# Patient Record
Sex: MALE | Race: WHITE | NOT HISPANIC OR LATINO
[De-identification: names, ages, dates, MRNs, and addresses within clinical notes are randomized per-mention and may not be internally consistent; named-entity substitution may affect disease eponyms.]

---

## 2020-01-09 ENCOUNTER — APPOINTMENT (OUTPATIENT)
Dept: PULMONOLOGY | Facility: CLINIC | Age: 70
End: 2020-01-09
Payer: COMMERCIAL

## 2020-01-09 VITALS
DIASTOLIC BLOOD PRESSURE: 60 MMHG | RESPIRATION RATE: 17 BRPM | BODY MASS INDEX: 26.72 KG/M2 | HEART RATE: 76 BPM | SYSTOLIC BLOOD PRESSURE: 120 MMHG | HEIGHT: 66 IN | OXYGEN SATURATION: 96 % | WEIGHT: 166.25 LBS

## 2020-01-09 PROCEDURE — 94060 EVALUATION OF WHEEZING: CPT

## 2020-01-09 PROCEDURE — 99205 OFFICE O/P NEW HI 60 MIN: CPT | Mod: 25

## 2020-01-09 PROCEDURE — 94729 DIFFUSING CAPACITY: CPT

## 2020-01-09 PROCEDURE — 94726 PLETHYSMOGRAPHY LUNG VOLUMES: CPT

## 2020-01-09 PROCEDURE — ZZZZZ: CPT

## 2020-01-09 PROCEDURE — G0296 VISIT TO DETERM LDCT ELIG: CPT

## 2020-01-09 NOTE — PHYSICAL EXAM
[General Appearance - Well Developed] : well developed [Normal Appearance] : normal appearance [Well Groomed] : well groomed [General Appearance - Well Nourished] : well nourished [General Appearance - In No Acute Distress] : no acute distress [No Deformities] : no deformities [Normal Conjunctiva] : the conjunctiva exhibited no abnormalities [Eyelids - No Xanthelasma] : the eyelids demonstrated no xanthelasmas [Normal Oropharynx] : normal oropharynx [Neck Appearance] : the appearance of the neck was normal [Neck Cervical Mass (___cm)] : no neck mass was observed [Jugular Venous Distention Increased] : there was no jugular-venous distention [Thyroid Diffuse Enlargement] : the thyroid was not enlarged [Heart Rate And Rhythm] : heart rate and rhythm were normal [Thyroid Nodule] : there were no palpable thyroid nodules [Heart Sounds] : normal S1 and S2 [Murmurs] : no murmurs present [Exaggerated Use Of Accessory Muscles For Inspiration] : no accessory muscle use [Respiration, Rhythm And Depth] : normal respiratory rhythm and effort [Abdomen Tenderness] : non-tender [Auscultation Breath Sounds / Voice Sounds] : lungs were clear to auscultation bilaterally [Abdomen Soft] : soft [Nail Clubbing] : no clubbing of the fingernails [Abdomen Mass (___ Cm)] : no abdominal mass palpated [Petechial Hemorrhages (___cm)] : no petechial hemorrhages [Cyanosis, Localized] : no localized cyanosis [Skin Color & Pigmentation] : normal skin color and pigmentation [Skin Turgor] : normal skin turgor [Deep Tendon Reflexes (DTR)] : deep tendon reflexes were 2+ and symmetric [] : no rash [Sensation] : the sensory exam was normal to light touch and pinprick [Oriented To Time, Place, And Person] : oriented to person, place, and time [No Focal Deficits] : no focal deficits [Impaired Insight] : insight and judgment were intact [Affect] : the affect was normal [Abnormal Walk] : normal gait

## 2020-01-09 NOTE — HISTORY OF PRESENT ILLNESS
[FreeTextEntry1] : Patient is a 69 year old male former smoker, greater than 40 pack years, quit 11 years ago, presents to [facility] for evaluation dyspnea. Patient was encouraged  to be evaluated by his wife as she tells him she can hear him breathing heavily.  Patient reports he travels to [place] for work.  He does have difficulty with subway stairs.  He denies cough, chest pain hemoptysis or other systemic complaints.

## 2020-01-09 NOTE — PROCEDURE
[FreeTextEntry1] : PFT 1/9/2020 personally reviewed mild obstructive ventilatory defect without gas exchange abnormality and insignificant bronchodilator response

## 2020-01-09 NOTE — PHYSICAL EXAM
[General Appearance - Well Developed] : well developed [Normal Appearance] : normal appearance [General Appearance - Well Nourished] : well nourished [Well Groomed] : well groomed [No Deformities] : no deformities [General Appearance - In No Acute Distress] : no acute distress [Eyelids - No Xanthelasma] : the eyelids demonstrated no xanthelasmas [Normal Conjunctiva] : the conjunctiva exhibited no abnormalities [Neck Appearance] : the appearance of the neck was normal [Normal Oropharynx] : normal oropharynx [Jugular Venous Distention Increased] : there was no jugular-venous distention [Neck Cervical Mass (___cm)] : no neck mass was observed [Thyroid Diffuse Enlargement] : the thyroid was not enlarged [Heart Rate And Rhythm] : heart rate and rhythm were normal [Thyroid Nodule] : there were no palpable thyroid nodules [Heart Sounds] : normal S1 and S2 [Murmurs] : no murmurs present [Exaggerated Use Of Accessory Muscles For Inspiration] : no accessory muscle use [Respiration, Rhythm And Depth] : normal respiratory rhythm and effort [Abdomen Tenderness] : non-tender [Abdomen Soft] : soft [Auscultation Breath Sounds / Voice Sounds] : lungs were clear to auscultation bilaterally [Abdomen Mass (___ Cm)] : no abdominal mass palpated [Nail Clubbing] : no clubbing of the fingernails [Petechial Hemorrhages (___cm)] : no petechial hemorrhages [Cyanosis, Localized] : no localized cyanosis [Skin Color & Pigmentation] : normal skin color and pigmentation [] : no rash [Skin Turgor] : normal skin turgor [Deep Tendon Reflexes (DTR)] : deep tendon reflexes were 2+ and symmetric [Oriented To Time, Place, And Person] : oriented to person, place, and time [No Focal Deficits] : no focal deficits [Sensation] : the sensory exam was normal to light touch and pinprick [Impaired Insight] : insight and judgment were intact [Affect] : the affect was normal [Abnormal Walk] : normal gait

## 2020-01-09 NOTE — HISTORY OF PRESENT ILLNESS
[FreeTextEntry1] : Patient is a 69 year old male former smoker, greater than 40 pack years, quit 11 years ago, presents to [facility] for evaluation dyspnea. Patient was encouraged  to be evaluated by his wife as she tells him she can hear him breathing heavily.  Patient reports he travels to [place] for work.  He does have difficulty with subway stairs.  He denies cough, chest pain hemoptysis or other systemic complaints.

## 2020-03-22 ENCOUNTER — TRANSCRIPTION ENCOUNTER (OUTPATIENT)
Age: 70
End: 2020-03-22

## 2020-06-09 ENCOUNTER — APPOINTMENT (OUTPATIENT)
Dept: PULMONOLOGY | Facility: CLINIC | Age: 70
End: 2020-06-09
Payer: COMMERCIAL

## 2020-06-09 ENCOUNTER — LABORATORY RESULT (OUTPATIENT)
Age: 70
End: 2020-06-09

## 2020-06-09 VITALS
HEIGHT: 65 IN | HEART RATE: 70 BPM | SYSTOLIC BLOOD PRESSURE: 120 MMHG | BODY MASS INDEX: 27.49 KG/M2 | DIASTOLIC BLOOD PRESSURE: 60 MMHG | TEMPERATURE: 97.8 F | OXYGEN SATURATION: 97 % | RESPIRATION RATE: 16 BRPM | WEIGHT: 165 LBS

## 2020-06-09 DIAGNOSIS — Z87.19 PERSONAL HISTORY OF OTHER DISEASES OF THE DIGESTIVE SYSTEM: ICD-10-CM

## 2020-06-09 DIAGNOSIS — Z01.812 ENCOUNTER FOR PREPROCEDURAL LABORATORY EXAMINATION: ICD-10-CM

## 2020-06-09 DIAGNOSIS — Z86.19 PERSONAL HISTORY OF OTHER INFECTIOUS AND PARASITIC DISEASES: ICD-10-CM

## 2020-06-09 DIAGNOSIS — Z81.2 FAMILY HISTORY OF TOBACCO ABUSE AND DEPENDENCE: ICD-10-CM

## 2020-06-09 DIAGNOSIS — Z82.62 FAMILY HISTORY OF OSTEOPOROSIS: ICD-10-CM

## 2020-06-09 DIAGNOSIS — Z87.09 PERSONAL HISTORY OF OTHER DISEASES OF THE RESPIRATORY SYSTEM: ICD-10-CM

## 2020-06-09 PROCEDURE — 94618 PULMONARY STRESS TESTING: CPT

## 2020-06-09 PROCEDURE — 71046 X-RAY EXAM CHEST 2 VIEWS: CPT

## 2020-06-09 PROCEDURE — 99204 OFFICE O/P NEW MOD 45 MIN: CPT | Mod: 25

## 2020-06-09 NOTE — PHYSICAL EXAM
[No Acute Distress] : no acute distress [Normal Oropharynx] : normal oropharynx [Normal Appearance] : normal appearance [No Neck Mass] : no neck mass [Normal Rate/Rhythm] : normal rate/rhythm [Normal S1, S2] : normal s1, s2 [No Murmurs] : no murmurs [No Resp Distress] : no resp distress [Clear to Auscultation Bilaterally] : clear to auscultation bilaterally [No Abnormalities] : no abnormalities [Benign] : benign [Normal Gait] : normal gait [No Clubbing] : no clubbing [No Cyanosis] : no cyanosis [No Edema] : no edema [FROM] : FROM [Normal Color/ Pigmentation] : normal color/ pigmentation [No Focal Deficits] : no focal deficits [Oriented x3] : oriented x3 [Normal Affect] : normal affect [II] : Mallampati Class: II [TextBox_68] : I:E ratio 1:3; mild expiratory wheeze

## 2020-06-09 NOTE — ADDENDUM
[FreeTextEntry1] : Documented by Merry Ortiz acting as a scribe for Dr. Grzegorz Corado on 06/09/2020.\par \par All medical record entries made by the Scribe were at my, Dr. Grzegorz Corado's, direction and personally dictated by me on 06/09/2020. I have reviewed the chart and agree that the record accurately reflects my personal performance of the history, physical exam, assessment and plan. I have also personally directed, reviewed, and agree with the discharge instructions.

## 2020-06-09 NOTE — HISTORY OF PRESENT ILLNESS
Conjunctivitis, Bacterial    You have an infection in the membranes covering the white part of the eye. This part of the eye is called the conjunctiva. The infection is called conjunctivitis. The most common symptoms of conjunctivitis include a thick, pus-like discharge from the eye, swollen eyelids, redness, eyelids sticking together upon awakening, and a gritty or scratchy feeling in the eye. Your infection was caused by bacteria. It may be treated with medicine. With treatment, the infection takes about 7 to 10 days to resolve.  Home care  · Use prescribed antibiotic eye drops or ointment as directed to treat the infection.  · Apply a warm compress (towel soaked in warm water) to the affected eye 3 to 4 times a day. Do this just before applying medicine to the eye.  · Use a warm, wet cloth to wipe away crusting of the eyelids in the morning. This is caused by mucus drainage during the night. You may also use saline irrigating solution or artificial tears to rinse away mucus in the eye. Do not put a patch over the eye.  · Wash your hands before and after touching the infected eye. This is to prevent spreading the infection to the other eye, and to other people. Do not share your towels or washcloths with others.  · You may use acetaminophen or ibuprofen to control pain, unless another medicine was prescribed. (Note: If you have chronic liver or kidney disease or have ever had a stomach ulcer or gastrointestinal bleeding, talk with your doctor before using these medicines.)  · Do not wear contact lenses until your eyes have healed and all symptoms are gone.  Follow-up care  Follow up with your healthcare provider, or as advised.  When to seek medical advice  Call your healthcare provider right away if any of these occur:  · Worsening vision  · Increasing pain in the eye  · Increasing swelling or redness of the eyelid  · Redness spreading around the eye  Date Last Reviewed: 6/14/2015  © 8649-3727 [employer]. [address] [place] [address], [place] [address]. All rights reserved. This information is not intended as a substitute for professional medical care. Always follow your healthcare professional's instructions.         [TextBox_4] : Mr. TAYLOR is a 70 year old male presenting to the office today for initial pulmonary evaluation. His chief complaint is\par - He has been having some difficulty breathing, he has trouble catching his breath.\par - he was put on Spiriva by another doctor. He did not feel like Spiriva was helping much.  \par - He saw , she sent him for a CT, she gave him samples for Anoro. He waited for his CT scan results for awhile now. \par - his SOB happens sometimes when he walks, when he's watching TV, it will come on randomly \par - sometimes he has a slight chest pressure / pain \par - he has never seen a cardiologist \par - his sinuses are fine \par - he has recently gained weight due to the quarantine because of the pandemic\par - when he wakes up in the morning he does not feel rested, he never has. \par - He usually wakes up several times a night, he wakes up and goes back to sleep. He does not sleep through the night at all. \par - he notes he snores a lot, his  prescribed him Flonase and he uses the nasal strips. When he uses those two he does not snore as MUCH but still snores\par - he notes he probably moves his legs are night because whenever he lays down he's always moving his legs\par - his memory / concentration could be better \par - his neck size 15 1/2 \par - could fall asleep while watching a boring TV show  \par - could fall asleep in a car, when he drives he feels like he could fall asleep as well \par - he notes he has always had visual issues, he feels his eyes have gotten worse during the years. His eyes get tired, in the morning his eyes are blurry as well. \par - he notes he has had muscle aches / pains lately\par - he feels like there' s a "flap" in his throat which causes him to clear his throat and cough to clear. \par -he denies any headaches, nausea, vomiting, fever, chills, sweats, chest pain, chest pressure, diarrhea, constipation, dysphagia, dizziness, leg swelling, leg pain, itchy eyes, itchy ears, heartburn, reflux, sour taste in the mouth, myalgias or arthralgias.

## 2020-06-09 NOTE — ASSESSMENT
[FreeTextEntry1] : Mr. TAYLOR is a 70 year old male with a history of  childhood illnesses, former 40+ pack year smoker none in 11 years, some chronic back issues, lumbar radiculopathy, LPR/GERD, mild COPD who comes into the office today for pulmonary evaluation for SOB\par \par The patient's shortness of breath is multifactorial due to:\par -pulmonary disease \par      - COPD \par -poor breathing mechanics \par -out of shape\par -?cardiac disease \par \par \par Problem 1:COPD (some emphysema)\par - Add Trelegy 1 inhalation QD (sample given) OR Stiolto at 2 inhalations daily  (sample given)\par - Get Blood test: Alpha1 Antitrypsin level \par -COPD is a progressive disease and although it can’t be cured , appropriate management can slow its progression, reduce frequency and severity of exacerbations, and improve symptoms and the patient quality of life. Hospitalizations are the greatest contributor to the total COPD costs and account for up to 87% of total COPD related costs. Exacerbations are the main cause of admissions and subsequently account for the 40-75% of COPD costs. Inhaled maintenance therapy reduces the incidence of exacerbations in patients with stable COPD. Incorrect inhaler use and nonadherence are major obstacles to achieving COPD treatment goals. Many COPD patients have challenges (impaired inhalation, limited dexterity, reduced cognition: that limit their ability to correctly use their COPD treatment devices resulting in reduced symptom control. Of most importance is smoking cessation and early intervention with respiratory illnesses and contemplation for pulmonary rehab to enhance quality of life. \par -Inhaler technique reviewed as well as oral hygiene techniques reviewed with patient. Avoidance of cold air, extremes of temperature, rescue inhaler should be used before exercise. Order of medication reviewed with patient. Recommended use of a cool mist humidifier in the bedroom.\par \par  \par \par Problem 2: LPR/ GERD \par -continue Protonix 40 mg before breakfast \par -Rule of 2s: avoid eating too much, eating too fast, eating too late, eating too spicy, eating too lousy, eating two hours before bed.\par -Things to avoid including overeating, spicy foods, tight clothing, eating within three hours of bed, this list is not all inclusive. \par -For treatment of reflux, possible options discussed including diet control, H2 blockers, PPIs, as well as coating motility agents discussed as treatment options. Timing of meals and proximity of last meal to sleep were discussed. If symptoms persist, a formal gastrointestinal evaluation is needed.\par \par \par \par Problem 3: Lung CA screening\par - Next CT in march\par Lung cancer screening is recommended for people between the ages of 55 and 80 with prior 30+ pack year smoking histories. There is irrefutable evidence for realization of lung cancer screening based on two large randomized control trials demonstrating relative reduction in lung cancer mortality for patients undergoing low-dose CT scanning. Risks and benefits reviewed with the patient.\par \par  \par \par Problem 4: Poor sleep / rule out ?SYDNEY   (memory/concentration, metabolism, reflux)\par - Recommended Home Sleep Study \par - Recommended oral appliance \par Sleep apnea is associated with adverse clinical consequences which an affect most organ systems. Cardiovascular disease risk includes arrhythmias, atrial fibrillation, hypertension, coronary artery disease, and stroke. Metabolic disorders include diabetes type 2, non-alcoholic fatty liver disease. Mood disorder especially depression; and cognitive decline especially in the elderly. Associations with chronic reflux/Winkler’s esophagus some but not all inclusive. \par \par \par  Problem 4: ?RLS\par -Studies to complete: iron studies, thyroid function test, free and total testosterone level \par - Add Mirapex .5 mg QHS \par Restless Legs Syndrome (RLS), also known as Wing-Ekbom Disease, is a common sleep -related movement disorder. About 1 in 10 adults in the [place] have problems from restless leg syndrome. It also can be seen in about 2% of children. Women are twice as likely as men to have RLS. People with RLS will have symptoms most often during times when they are less active, especially at bedtime. RLS most often causes an overwhelming urge to move your legs and sometimes other parts of your body. This urge is associated with unpleasant sensations in different parts of the body. The symptoms can be mild to severe and can affect your ability to go to sleep and stay asleep. People with RLS often sleep less at night and feel more tired during the day. \par \par  \par Problem 5: Poor Mechanics of Breathing / Anxiety \par - Proper breathing techniques were reviewed with an emphasis of exhalation. Patient instructed to breath in for 1 second and out for four seconds. Patient was encouraged to not talk while walking. \par \par Problem 6 : Out of shape\par -Weight loss, exercise, and diet control were discussed and are highly encouraged. Treatment options were given such as, aqua therapy, and contacting a nutritionist. Recommended to use the elliptical, stationary bike, less use of treadmill.  \par \par Problem 7 : Cardiac\par -recommended to follow up with a cardiologist\par \par Problem 8 : Health Maintenance/COVID19 Precautions:\par - Clean your hands often. Wash your hands often with soap and water for at least 20 seconds, especially after blowing your nose, coughing, or sneezing, or having been in a public place.\par - If soap and water are not available, use a hand  that contains at least 60% alcohol.\par - To the extent possible, avoid touching high-touch surfaces in public places - elevator buttons, door handles, handrails, handshaking with people, etc. Use a tissue or your sleeve to cover your hand or finger if you must touch something.\par - Wash your hands after touching surfaces in public places.\par - Avoid touching your face, nose, eyes, etc.\par - Clean and disinfect your home to remove germs: practice routine cleaning of frequently touched surfaces (for example: tables, doorknobs, light switches, handles, desks, toilets, faucets, sinks & cell phones)\par - Avoid crowds, especially in poorly ventilated spaces. Your risk of exposure to respiratory viruses like COVID-19 may increase in crowded, closed-in settings with little air circulation if there are people in the crowd who are sick. All patients are recommended to practice social distancing and stay at least 6 feet away from others.\par - Avoid all non-essential travel including plane trips, and especially avoid embarking on cruise ships.\par -If COVID-19 is spreading in your community, take extra measures to put distance between yourself and other people to further reduce your risk of being exposed to this new virus.\par -Stay home as much as possible.\par - Consider ways of getting food brought to your house through family, social, or commercial networks\par -Be aware that the virus has been known to live in the air up to 3 hours post exposure, cardboard up to 24 hours post exposure, copper up to 4 hours post exposure, steel and plastic up to 2-3 days post exposure. Risk of transmission from these surfaces are affected by many variables.\par Immune Support Recommendations:\par -OTC Vitamin C 500mg BID \par -OTC Quercetin 250-500mg BID \par -OTC Zinc 75-100mg per day \par -OTC Melatonin 1 or 2 mg a night \par -OTC Vitamin D 1-4000mg per day \par -OTC Tonic Water 8oz per day\par Asthma and COVID19:\par You need to make sure your asthma is under control. This often requires the use of inhaled corticosteroids (and sometimes oral corticosteroids). Inhaled corticosteroids do not likely reduce your immune system’s ability to fight infections, but oral corticosteroids may. It is important to use the steps above to protect yourself to limit your exposure to any respiratory virus.\par \par \par Problem 9 : health maintenance\par -s/p influenza vaccine\par -recommended strep pneumonia vaccines after age 65: Prevnar-13 vaccine, followed by Pneumo vaccine 23 one year following\par -recommended early intervention for URIs\par -recommended regular osteoporosis evaluations\par -recommended early dermatological evaluations\par -recommended after the age of 50 to the age of 70, colonoscopy every 5 years \par \par  Follow up in 6-8 weeks\par -he  is recommended to call with any changes, questions, or concerns.

## 2020-06-10 LAB
A1AT SERPL-MCNC: 129 MG/DL
FERRITIN SERPL-MCNC: 159 NG/ML
IRON SATN MFR SERPL: 33 %
IRON SERPL-MCNC: 123 UG/DL
T3FREE SERPL-MCNC: 3.22 PG/ML
T4 FREE SERPL-MCNC: 1.2 NG/DL
TIBC SERPL-MCNC: 370 UG/DL
TSH SERPL-ACNC: 2.66 UIU/ML
UIBC SERPL-MCNC: 248 UG/DL

## 2020-06-14 LAB
TESTOST BND SERPL-MCNC: 9.2 PG/ML
TESTOST SERPL-MCNC: 483.2 NG/DL

## 2020-06-15 ENCOUNTER — TRANSCRIPTION ENCOUNTER (OUTPATIENT)
Age: 70
End: 2020-06-15

## 2020-06-16 LAB
A1AT PHENOTYP SERPL-IMP: NORMAL BANDS
A1AT SERPL-MCNC: 127 MG/DL

## 2020-06-24 ENCOUNTER — TRANSCRIPTION ENCOUNTER (OUTPATIENT)
Age: 70
End: 2020-06-24

## 2020-08-24 PROBLEM — Z01.812 PRE-PROCEDURAL LABORATORY EXAMINATION: Status: ACTIVE | Noted: 2020-08-24

## 2020-09-01 LAB — SARS-COV-2 N GENE NPH QL NAA+PROBE: NOT DETECTED

## 2020-09-02 ENCOUNTER — APPOINTMENT (OUTPATIENT)
Dept: PULMONOLOGY | Facility: CLINIC | Age: 70
End: 2020-09-02
Payer: COMMERCIAL

## 2020-09-02 VITALS
DIASTOLIC BLOOD PRESSURE: 70 MMHG | WEIGHT: 161 LBS | OXYGEN SATURATION: 98 % | RESPIRATION RATE: 16 BRPM | TEMPERATURE: 97.7 F | BODY MASS INDEX: 26.82 KG/M2 | SYSTOLIC BLOOD PRESSURE: 110 MMHG | HEART RATE: 73 BPM | HEIGHT: 65 IN

## 2020-09-02 PROCEDURE — 99214 OFFICE O/P EST MOD 30 MIN: CPT | Mod: 25

## 2020-09-02 PROCEDURE — 94010 BREATHING CAPACITY TEST: CPT

## 2020-09-02 PROCEDURE — 95012 NITRIC OXIDE EXP GAS DETER: CPT

## 2020-09-02 PROCEDURE — 94727 GAS DIL/WSHOT DETER LNG VOL: CPT

## 2020-09-02 PROCEDURE — 94729 DIFFUSING CAPACITY: CPT

## 2020-09-02 NOTE — ASSESSMENT
[FreeTextEntry1] : Mr. TAYLOR is a 70 year old male with a history of  childhood illnesses, former 40+ pack year smoker none in 11 years, some chronic back issues, lumbar radiculopathy, LPR/GERD, mild COPD who comes into the office today for follow up pulmonary evaluation for SOB - improved \par \par The patient's shortness of breath is multifactorial due to:\par -pulmonary disease \par      - COPD \par -poor breathing mechanics \par -out of shape\par -?cardiac disease \par \par \par Problem 1:COPD (some emphysema) - improved \par - Add Trelegy 1 inhalation QD (sample given) rather than Spiriva \par - s/p Blood test: Alpha1 Antitrypsin level (WNL)\par -COPD is a progressive disease and although it can’t be cured , appropriate management can slow its progression, reduce frequency and severity of exacerbations, and improve symptoms and the patient quality of life. Hospitalizations are the greatest contributor to the total COPD costs and account for up to 87% of total COPD related costs. Exacerbations are the main cause of admissions and subsequently account for the 40-75% of COPD costs. Inhaled maintenance therapy reduces the incidence of exacerbations in patients with stable COPD. Incorrect inhaler use and nonadherence are major obstacles to achieving COPD treatment goals. Many COPD patients have challenges (impaired inhalation, limited dexterity, reduced cognition: that limit their ability to correctly use their COPD treatment devices resulting in reduced symptom control. Of most importance is smoking cessation and early intervention with respiratory illnesses and contemplation for pulmonary rehab to enhance quality of life. \par -Inhaler technique reviewed as well as oral hygiene techniques reviewed with patient. Avoidance of cold air, extremes of temperature, rescue inhaler should be used before exercise. Order of medication reviewed with patient. Recommended use of a cool mist humidifier in the bedroom.\par \par  \par \par Problem 2: LPR/ GERD \par -continue Protonix 40 mg before breakfast \par -Rule of 2s: avoid eating too much, eating too fast, eating too late, eating too spicy, eating too lousy, eating two hours before bed.\par -Things to avoid including overeating, spicy foods, tight clothing, eating within three hours of bed, this list is not all inclusive. \par -For treatment of reflux, possible options discussed including diet control, H2 blockers, PPIs, as well as coating motility agents discussed as treatment options. Timing of meals and proximity of last meal to sleep were discussed. If symptoms persist, a formal gastrointestinal evaluation is needed.\par \par \par Problem 3: Lung CA screening\par - Next CT in march 2021\par Lung cancer screening is recommended for people between the ages of 55 and 80 with prior 30+ pack year smoking histories. There is irrefutable evidence for realization of lung cancer screening based on two large randomized control trials demonstrating relative reduction in lung cancer mortality for patients undergoing low-dose CT scanning. Risks and benefits reviewed with the patient.\par \par \par Problem 4: Poor sleep / (+) mild SYDNEY   (memory/concentration, metabolism, reflux)\par - s/p Home Sleep Study - c/w mild sleep apnea \par - unable to use oral appliance ; - Sleep Right / Oxy Aid / Chin Strap \par Sleep apnea is associated with adverse clinical consequences which an affect most organ systems. Cardiovascular disease risk includes arrhythmias, atrial fibrillation, hypertension, coronary artery disease, and stroke. Metabolic disorders include diabetes type 2, non-alcoholic fatty liver disease. Mood disorder especially depression; and cognitive decline especially in the elderly. Associations with chronic reflux/Winkler’s esophagus some but not all inclusive. \par \par \par  Problem 4: ?RLS\par -s/p : iron studies, thyroid function test, free and total testosterone level (WNL)\par - continue Mirapex .5 mg QHS \par Restless Legs Syndrome (RLS), also known as Wing-Ekbom Disease, is a common sleep -related movement disorder. About 1 in 10 adults in the [place] have problems from restless leg syndrome. It also can be seen in about 2% of children. Women are twice as likely as men to have RLS. People with RLS will have symptoms most often during times when they are less active, especially at bedtime. RLS most often causes an overwhelming urge to move your legs and sometimes other parts of your body. This urge is associated with unpleasant sensations in different parts of the body. The symptoms can be mild to severe and can affect your ability to go to sleep and stay asleep. People with RLS often sleep less at night and feel more tired during the day. \par \par  \par Problem 5: Poor Mechanics of Breathing / Anxiety \par - Proper breathing techniques were reviewed with an emphasis of exhalation. Patient instructed to breath in for 1 second and out for four seconds. Patient was encouraged to not talk while walking. \par \par Problem 6 : Out of shape\par -Weight loss, exercise, and diet control were discussed and are highly encouraged. Treatment options were given such as, aqua therapy, and contacting a nutritionist. Recommended to use the elliptical, stationary bike, less use of treadmill.  \par \par Problem 7 : Cardiac\par -recommended to follow up with a cardiologist\par \par Problem 8 : Health Maintenance/COVID19 Precautions:\par - Clean your hands often. Wash your hands often with soap and water for at least 20 seconds, especially after blowing your nose, coughing, or sneezing, or having been in a public place.\par - If soap and water are not available, use a hand  that contains at least 60% alcohol.\par - To the extent possible, avoid touching high-touch surfaces in public places - elevator buttons, door handles, handrails, handshaking with people, etc. Use a tissue or your sleeve to cover your hand or finger if you must touch something.\par - Wash your hands after touching surfaces in public places.\par - Avoid touching your face, nose, eyes, etc.\par - Clean and disinfect your home to remove germs: practice routine cleaning of frequently touched surfaces (for example: tables, doorknobs, light switches, handles, desks, toilets, faucets, sinks & cell phones)\par - Avoid crowds, especially in poorly ventilated spaces. Your risk of exposure to respiratory viruses like COVID-19 may increase in crowded, closed-in settings with little air circulation if there are people in the crowd who are sick. All patients are recommended to practice social distancing and stay at least 6 feet away from others.\par - Avoid all non-essential travel including plane trips, and especially avoid embarking on cruise ships.\par -If COVID-19 is spreading in your community, take extra measures to put distance between yourself and other people to further reduce your risk of being exposed to this new virus.\par -Stay home as much as possible.\par - Consider ways of getting food brought to your house through family, social, or commercial networks\par -Be aware that the virus has been known to live in the air up to 3 hours post exposure, cardboard up to 24 hours post exposure, copper up to 4 hours post exposure, steel and plastic up to 2-3 days post exposure. Risk of transmission from these surfaces are affected by many variables.\par Immune Support Recommendations:\par -OTC Vitamin C 500mg BID \par -OTC Quercetin 250-500mg BID \par -OTC Zinc 75-100mg per day \par -OTC Melatonin 1 or 2 mg a night \par -OTC Vitamin D 1-4000mg per day \par -OTC Tonic Water 8oz per day\par Asthma and COVID19:\par You need to make sure your asthma is under control. This often requires the use of inhaled corticosteroids (and sometimes oral corticosteroids). Inhaled corticosteroids do not likely reduce your immune system’s ability to fight infections, but oral corticosteroids may. It is important to use the steps above to protect yourself to limit your exposure to any respiratory virus.\par \par \par Problem 9 : health maintenance\par -s/p influenza vaccine\par -recommended strep pneumonia vaccines after age 65: Prevnar-13 vaccine, followed by Pneumo vaccine 23 one year following\par -recommended early intervention for URIs\par -recommended regular osteoporosis evaluations\par -recommended early dermatological evaluations\par -recommended after the age of 50 to the age of 70, colonoscopy every 5 years \par \par  Follow up in 6-8 weeks\par -he  is recommended to call with any changes, questions, or concerns.

## 2020-09-02 NOTE — HISTORY OF PRESENT ILLNESS
[TextBox_4] : Mr. TAYLOR is a 70 year old male presenting to the office today for a follow up pulmonary evaluation. His chief complaint is\par - he has been feeling okay, the last time he was here and he was given the samples of Trelegy, he notes the Trelegy was okay. \par - he had the Spiriva and he renewed it for a month. He does the Spiriva BID in the morning. \par - he notes he felt a bit better with the Trelegy. \par - he notes he [place] into the city and wont get on the train \par - he notes he went to get a COVID swab \par - bowel movements are regular \par - he has a sour taste in the mouth due to the mask \par - he is still coughing sometimes when he eats, when he eats dinner he starts coughing but nothing else significant\par - he notes he has chronic back pain\par - he notes he had the sleep study done \par - he notes he sleeps at 9:30 pm and wakes up at 4am. \par - He denies any visual issues, headaches, nausea, vomiting, fever, chills, sweats, chest pains, chest pressure, diarrhea, constipation, dysphagia, myalgia, dizziness, leg swelling, leg pain, itchy eyes, itchy ears, heartburn, reflux, or sour taste in the mouth.\par

## 2020-09-02 NOTE — PHYSICAL EXAM
[Normal Oropharynx] : normal oropharynx [No Acute Distress] : no acute distress [Normal Rate/Rhythm] : normal rate/rhythm [Normal Appearance] : normal appearance [No Neck Mass] : no neck mass [Normal S1, S2] : normal s1, s2 [No Murmurs] : no murmurs [No Resp Distress] : no resp distress [Clear to Auscultation Bilaterally] : clear to auscultation bilaterally [Benign] : benign [No Abnormalities] : no abnormalities [No Clubbing] : no clubbing [No Cyanosis] : no cyanosis [Normal Gait] : normal gait [No Edema] : no edema [FROM] : FROM [Normal Color/ Pigmentation] : normal color/ pigmentation [No Focal Deficits] : no focal deficits [Oriented x3] : oriented x3 [Normal Affect] : normal affect [III] : Mallampati Class: III [TextBox_68] : I:E 1:3, clear

## 2020-09-02 NOTE — PROCEDURE
[FreeTextEntry1] : Sleep study (Jun.24.2020) revealed sleep apnea with an AHI/ROMELIA of 5.1, snore index of % and a low oxygen saturation of 84% \par \par FULL PFTs reveals mild obstructive moderate flows; FEV1 was   2.33L which is  87% of predicted; normal lung volumes; normal diffusion at 15.6, which is   82% of predicted; normal flow volume loop\par \par  FENO was 22 ; normal value being less than 25\par Fractional exhaled nitric oxide (FENO) is regarded as a simple, noninvasive method for assessing eosinophilic airway inflammation. Produced by a variety of cells within the lung, nitric oxide (NO) concentrations are generally low in healthy individuals. However, high concentrations of NO appear to be involved in nonspecific host defense mechanisms and chronic inflammatory diseases such as asthma. The American Thor[employer] Society (ATS) therefore has recommended using FENO to aid in the diagnosis and monitoring of eosinophilic airway inflammation and asthma, and for identifying steroid responsive individuals whose chronic respiratory symptoms may be airway inflammation.\par \par \par

## 2020-09-02 NOTE — ADDENDUM
[FreeTextEntry1] : Documented by Merry Ortiz acting as a scribe for Dr. Grzegorz Corado on 09/02/2020 \par \par All medical record entries made by the Scribe were at my, Dr. Grzegorz Corado's, direction and personally dictated by me on 09/02/2020 . I have reviewed the chart and agree that the record accurately reflects my personal performance of the history, physical exam, assessment and plan. I have also personally directed, reviewed, and agree with the discharge instructions.

## 2020-11-05 ENCOUNTER — RX RENEWAL (OUTPATIENT)
Age: 70
End: 2020-11-05

## 2020-12-18 ENCOUNTER — RX RENEWAL (OUTPATIENT)
Age: 70
End: 2020-12-18

## 2021-01-05 ENCOUNTER — APPOINTMENT (OUTPATIENT)
Dept: PULMONOLOGY | Facility: CLINIC | Age: 71
End: 2021-01-05
Payer: COMMERCIAL

## 2021-01-05 VITALS
HEIGHT: 65 IN | WEIGHT: 162 LBS | DIASTOLIC BLOOD PRESSURE: 78 MMHG | HEART RATE: 74 BPM | RESPIRATION RATE: 17 BRPM | SYSTOLIC BLOOD PRESSURE: 120 MMHG | OXYGEN SATURATION: 97 % | TEMPERATURE: 98.1 F | BODY MASS INDEX: 26.99 KG/M2

## 2021-01-05 PROCEDURE — 99214 OFFICE O/P EST MOD 30 MIN: CPT

## 2021-01-05 PROCEDURE — 99072 ADDL SUPL MATRL&STAF TM PHE: CPT

## 2021-01-05 RX ORDER — OLOPATADINE HYDROCHLORIDE 665 UG/1
0.6 SPRAY, METERED NASAL
Qty: 3 | Refills: 1 | Status: ACTIVE | COMMUNITY
Start: 2021-01-05 | End: 1900-01-01

## 2021-01-05 NOTE — HISTORY OF PRESENT ILLNESS
[TextBox_4] : Mr. TAYLOR is a 70 year old male presenting to the office today for a follow up pulmonary evaluation. His chief complaint is\par -He notes feeling well in general \par -He notes his wife notices him breathing loudly\par -He notes breathing heavy when lying down\par -He notes wearing the breath right strip and still breathes heavily \par -He notes needing surgery due to having 5 polyps s/p colonoscopy\par -Surgery is 1/26/2021\par -He notes SOB on exertion when walking fast \par -He notes having issues with his throat, noting hoarseness after talking frequently \par -He notes his vocal chords over lap which causes the hoarseness \par -He notes taking Trelegy every day \par -He notes his medication are not making him worse \par -He denies exercising \par \par - denies any headaches, nausea, vomiting, fever, chills, sweats, chest pain, chest pressure, diarrhea, constipation, dysphagia, dizziness, leg swelling, leg pain, itchy eyes, itchy ears, heartburn, reflux, or sour taste in the mouth.

## 2021-01-05 NOTE — ADDENDUM
[FreeTextEntry1] : Documented by Jose Armando Kaminski acting as a scribe for Dr. Grzegorz Corado on (01/05/2021).\par \par All medical record entries made by the Scribe were at my, Dr. Grzegorz Corado's, direction and personally dictated by me on (01/05/2021). I have reviewed the chart and agree that the record accurately reflects my personal performance of the history, physical exam, assessment and plan. I have also personally directed, reviewed, and agree with the discharge instructions.

## 2021-01-05 NOTE — ASSESSMENT
[FreeTextEntry1] : Mr. TAYLOR is a 70 year old male with a history of  childhood illnesses, former 40+ pack year smoker none in 11 years, some chronic back issues, lumbar radiculopathy, LPR/GERD, mild COPD who comes into the office today for follow up pulmonary evaluation for SOB (   of breathing) \par \par ********************************************** Pre-op Clearance*****************************************************************\par -For colonoscopy and possible colon surgery\par \par The patient's shortness of breath is multifactorial due to:\par -pulmonary disease \par      - COPD \par -poor breathing mechanics \par -out of shape\par -?cardiac disease \par \par \par Problem 1:COPD (some emphysema) - improved \par - add Singulair 10 mg QHS\par - continue Trelegy 1 inhalation QD (sample given) rather than Spiriva \par - s/p Blood test: Alpha1 Antitrypsin level (WNL)\par -COPD is a progressive disease and although it can’t be cured , appropriate management can slow its progression, reduce frequency and severity of exacerbations, and improve symptoms and the patient quality of life. Hospitalizations are the greatest contributor to the total COPD costs and account for up to 87% of total COPD related costs. Exacerbations are the main cause of admissions and subsequently account for the 40-75% of COPD costs. Inhaled maintenance therapy reduces the incidence of exacerbations in patients with stable COPD. Incorrect inhaler use and nonadherence are major obstacles to achieving COPD treatment goals. Many COPD patients have challenges (impaired inhalation, limited dexterity, reduced cognition: that limit their ability to correctly use their COPD treatment devices resulting in reduced symptom control. Of most importance is smoking cessation and early intervention with respiratory illnesses and contemplation for pulmonary rehab to enhance quality of life. \par -Inhaler technique reviewed as well as oral hygiene techniques reviewed with patient. Avoidance of cold air, extremes of temperature, rescue inhaler should be used before exercise. Order of medication reviewed with patient. Recommended use of a cool mist humidifier in the bedroom.\par \par Problem 1A: Allergy\par -ENT Dr. Perlman\par - add Olopatadine 0.6% 1 sniff BID\par - Environmental measures for allergies were encouraged including mattress and pillow covers, air purifier, and environmental controls.\par \par Problem 2: LPR/ GERD \par -continue Dexalant 60mg \par -Rule of 2s: avoid eating too much, eating too fast, eating too late, eating too spicy, eating too lousy, eating two hours before bed.\par -Things to avoid including overeating, spicy foods, tight clothing, eating within three hours of bed, this list is not all inclusive. \par -For treatment of reflux, possible options discussed including diet control, H2 blockers, PPIs, as well as coating motility agents discussed as treatment options. Timing of meals and proximity of last meal to sleep were discussed. If symptoms persist, a formal gastrointestinal evaluation is needed.\par \par \par Problem 3: Lung CA screening\par - Next CT in march 2021\par Lung cancer screening is recommended for people between the ages of 55 and 80 with prior 30+ pack year smoking histories. There is irrefutable evidence for realization of lung cancer screening based on two large randomized control trials demonstrating relative reduction in lung cancer mortality for patients undergoing low-dose CT scanning. Risks and benefits reviewed with the patient.\par \par \par Problem 4: Poor sleep / (+) mild SYDNEY   (memory/concentration, metabolism, reflux)\par - s/p Home Sleep Study - c/w mild sleep apnea \par - unable to use oral appliance ; - Sleep Right / Oxy Aid / Chin Strap \par Sleep apnea is associated with adverse clinical consequences which an affect most organ systems. Cardiovascular disease risk includes arrhythmias, atrial fibrillation, hypertension, coronary artery disease, and stroke. Metabolic disorders include diabetes type 2, non-alcoholic fatty liver disease. Mood disorder especially depression; and cognitive decline especially in the elderly. Associations with chronic reflux/Winkler’s esophagus some but not all inclusive. \par \par \par  Problem 4: ?RLS\par -s/p : iron studies, thyroid function test, free and total testosterone level (WNL)\par - continue Mirapex .5 mg QHS \par Restless Legs Syndrome (RLS), also known as Wing-Ekbom Disease, is a common sleep -related movement disorder. About 1 in 10 adults in the [place] have problems from restless leg syndrome. It also can be seen in about 2% of children. Women are twice as likely as men to have RLS. People with RLS will have symptoms most often during times when they are less active, especially at bedtime. RLS most often causes an overwhelming urge to move your legs and sometimes other parts of your body. This urge is associated with unpleasant sensations in different parts of the body. The symptoms can be mild to severe and can affect your ability to go to sleep and stay asleep. People with RLS often sleep less at night and feel more tired during the day. \par \par  \par Problem 5: Poor Mechanics of Breathing / Anxiety \par - Proper breathing techniques were reviewed with an emphasis of exhalation. Patient instructed to breath in for 1 second and out for four seconds. Patient was encouraged to not talk while walking. \par \par Problem 6 : Out of shape\par -Weight loss, exercise, and diet control were discussed and are highly encouraged. Treatment options were given such as, aqua therapy, and contacting a nutritionist. Recommended to use the elliptical, stationary bike, less use of treadmill.  \par \par Problem 7 : Cardiac\par -recommended to follow up with a cardiologist\par \par Problem 8 : Health Maintenance/COVID19 Precautions:\par - Clean your hands often. Wash your hands often with soap and water for at least 20 seconds, especially after blowing your nose, coughing, or sneezing, or having been in a public place.\par - If soap and water are not available, use a hand  that contains at least 60% alcohol.\par - To the extent possible, avoid touching high-touch surfaces in public places - elevator buttons, door handles, handrails, handshaking with people, etc. Use a tissue or your sleeve to cover your hand or finger if you must touch something.\par - Wash your hands after touching surfaces in public places.\par - Avoid touching your face, nose, eyes, etc.\par - Clean and disinfect your home to remove germs: practice routine cleaning of frequently touched surfaces (for example: tables, doorknobs, light switches, handles, desks, toilets, faucets, sinks & cell phones)\par - Avoid crowds, especially in poorly ventilated spaces. Your risk of exposure to respiratory viruses like COVID-19 may increase in crowded, closed-in settings with little air circulation if there are people in the crowd who are sick. All patients are recommended to practice social distancing and stay at least 6 feet away from others.\par - Avoid all non-essential travel including plane trips, and especially avoid embarking on cruise ships.\par -If COVID-19 is spreading in your community, take extra measures to put distance between yourself and other people to further reduce your risk of being exposed to this new virus.\par -Stay home as much as possible.\par - Consider ways of getting food brought to your house through family, social, or commercial networks\par -Be aware that the virus has been known to live in the air up to 3 hours post exposure, cardboard up to 24 hours post exposure, copper up to 4 hours post exposure, steel and plastic up to 2-3 days post exposure. Risk of transmission from these surfaces are affected by many variables.\par Immune Support Recommendations:\par -OTC Vitamin C 500mg BID \par -OTC Quercetin 250-500mg BID \par -OTC Zinc 75-100mg per day \par -OTC Melatonin 1 or 2 mg a night \par -OTC Vitamin D 1-4000mg per day \par -OTC Tonic Water 8oz per day\par Asthma and COVID19:\par You need to make sure your asthma is under control. This often requires the use of inhaled corticosteroids (and sometimes oral corticosteroids). Inhaled corticosteroids do not likely reduce your immune system’s ability to fight infections, but oral corticosteroids may. It is important to use the steps above to protect yourself to limit your exposure to any respiratory virus.\par \par \par Problem 9 : health maintenance\par -s/p influenza vaccine 2020\par -recommended strep pneumonia vaccines after age 65: Prevnar-13 vaccine, followed by Pneumo vaccine 23 one year following (completed )\par -recommended early intervention for URIs\par -recommended regular osteoporosis evaluations\par -recommended early dermatological evaluations\par -recommended after the age of 50 to the age of 70, colonoscopy every 5 years \par \par  Follow up in 6-8 weeks\par -he  is recommended to call with any changes, questions, or concerns.

## 2021-01-30 ENCOUNTER — TRANSCRIPTION ENCOUNTER (OUTPATIENT)
Age: 71
End: 2021-01-30

## 2021-02-01 ENCOUNTER — APPOINTMENT (OUTPATIENT)
Dept: PULMONOLOGY | Facility: CLINIC | Age: 71
End: 2021-02-01

## 2021-02-03 LAB — SARS-COV-2 N GENE NPH QL NAA+PROBE: NOT DETECTED

## 2021-02-07 LAB — SARS-COV-2 N GENE NPH QL NAA+PROBE: NOT DETECTED

## 2021-02-10 ENCOUNTER — APPOINTMENT (OUTPATIENT)
Dept: PULMONOLOGY | Facility: CLINIC | Age: 71
End: 2021-02-10
Payer: COMMERCIAL

## 2021-02-10 ENCOUNTER — NON-APPOINTMENT (OUTPATIENT)
Age: 71
End: 2021-02-10

## 2021-02-10 VITALS
SYSTOLIC BLOOD PRESSURE: 140 MMHG | OXYGEN SATURATION: 98 % | DIASTOLIC BLOOD PRESSURE: 80 MMHG | BODY MASS INDEX: 26.99 KG/M2 | RESPIRATION RATE: 16 BRPM | WEIGHT: 162 LBS | HEART RATE: 75 BPM | HEIGHT: 65 IN | TEMPERATURE: 98.2 F

## 2021-02-10 PROCEDURE — 94010 BREATHING CAPACITY TEST: CPT

## 2021-02-10 PROCEDURE — 95012 NITRIC OXIDE EXP GAS DETER: CPT

## 2021-02-10 PROCEDURE — 94618 PULMONARY STRESS TESTING: CPT

## 2021-02-10 PROCEDURE — 99214 OFFICE O/P EST MOD 30 MIN: CPT | Mod: 25

## 2021-02-10 PROCEDURE — 99072 ADDL SUPL MATRL&STAF TM PHE: CPT

## 2021-02-10 NOTE — PHYSICAL EXAM
[No Acute Distress] : no acute distress [Normal Oropharynx] : normal oropharynx [Normal Appearance] : normal appearance [No Neck Mass] : no neck mass [Normal Rate/Rhythm] : normal rate/rhythm [Normal S1, S2] : normal s1, s2 [No Murmurs] : no murmurs [No Resp Distress] : no resp distress [Clear to Auscultation Bilaterally] : clear to auscultation bilaterally [No Abnormalities] : no abnormalities [Benign] : benign [Normal Gait] : normal gait [No Clubbing] : no clubbing [No Cyanosis] : no cyanosis [No Edema] : no edema [FROM] : FROM [Normal Color/ Pigmentation] : normal color/ pigmentation [No Focal Deficits] : no focal deficits [Oriented x3] : oriented x3 [Normal Affect] : normal affect [I] : Mallampati Class: I [TextBox_68] : I:E ratio 1:3; clear

## 2021-02-10 NOTE — ADDENDUM
[FreeTextEntry1] : Documented by Geraldo Evans acting as a scribe for Dr. Grzegorz Corado on 02/10/2021.\par \par All medical record entries made by the Scribe were at my, Dr. Grzegorz Corado's, direction and personally dictated by me on 02/10/2021 . I have reviewed the chart and agree that the record accurately reflects my personal performance of the history, physical exam, assessment and plan. I have also personally directed, reviewed, and agree with the discharge instructions. \par

## 2021-02-10 NOTE — ASSESSMENT
[FreeTextEntry1] : Mr. TAYLOR is a 70 year old male with a history of  childhood illnesses, former 40+ pack year smoker none in 11 years, some chronic back issues, lumbar radiculopathy, LPR/GERD, mild COPD who comes into the office today for follow up pulmonary follow up for SOB\par \par ********************************************** Pre-op Clearance*****************************************************************\par \par The patient's shortness of breath is multifactorial due to:\par -pulmonary disease \par      - COPD \par -poor breathing mechanics \par -out of shape\par -?cardiac disease \par \par Problem 1:COPD (some emphysema) - improved \par - continue Singulair 10 mg QHS\par - discontinue (unable) Trelegy 1 inhalation QD and transition to Stiolto 2 puffs QD\par - s/p Blood test: Alpha1 Antitrypsin level (WNL)\par -COPD is a progressive disease and although it can’t be cured , appropriate management can slow its progression, reduce frequency and severity of exacerbations, and improve symptoms and the patient quality of life. Hospitalizations are the greatest contributor to the total COPD costs and account for up to 87% of total COPD related costs. Exacerbations are the main cause of admissions and subsequently account for the 40-75% of COPD costs. Inhaled maintenance therapy reduces the incidence of exacerbations in patients with stable COPD. Incorrect inhaler use and nonadherence are major obstacles to achieving COPD treatment goals. Many COPD patients have challenges (impaired inhalation, limited dexterity, reduced cognition: that limit their ability to correctly use their COPD treatment devices resulting in reduced symptom control. Of most importance is smoking cessation and early intervention with respiratory illnesses and contemplation for pulmonary rehab to enhance quality of life. \par -Inhaler technique reviewed as well as oral hygiene techniques reviewed with patient. Avoidance of cold air, extremes of temperature, rescue inhaler should be used before exercise. Order of medication reviewed with patient. Recommended use of a cool mist humidifier in the bedroom.\par \par Problem 1A: Allergy\par -ENT Dr. Perlman\par -continue Olopatadine 0.6% 1 sniff BID\par - Environmental measures for allergies were encouraged including mattress and pillow covers, air purifier, and environmental controls.\par \par Problem 2: LPR/ GERD \par -continue Dexalant 60mg qAM\par -Add Pepcid 40 mg QHS\par -Rule of 2s: avoid eating too much, eating too fast, eating too late, eating too spicy, eating too lousy, eating two hours before bed.\par -Things to avoid including overeating, spicy foods, tight clothing, eating within three hours of bed, this list is not all inclusive. \par -For treatment of reflux, possible options discussed including diet control, H2 blockers, PPIs, as well as coating motility agents discussed as treatment options. Timing of meals and proximity of last meal to sleep were discussed. If symptoms persist, a formal gastrointestinal evaluation is needed.\par \par ******************************************************Pre-op Clearance*************************************************************** \par Problem 2A: Pre-op Clearance Colon Surgery 3/2021\par -at this point in time there are no absolute pulmonary contraindications to go forward with the planned procedure \par -at the time of surgery s/he should have optimal pain control, incentive spirometry, early ambulation, DVT and GI prophylaxis.\par  \par Problem 3: Lung CA screening\par - Next CT in march 2021\par Lung cancer screening is recommended for people between the ages of 55 and 80 with prior 30+ pack year smoking histories. There is irrefutable evidence for realization of lung cancer screening based on two large randomized control trials demonstrating relative reduction in lung cancer mortality for patients undergoing low-dose CT scanning. Risks and benefits reviewed with the patient.\par \par Problem 4: Poor sleep / (+) mild SYDNEY   (memory/concentration, metabolism, reflux)\par - s/p Home Sleep Study - c/w mild sleep apnea \par - unable to use oral appliance ; - Sleep Right / Oxy Aid / Chin Strap \par Sleep apnea is associated with adverse clinical consequences which an affect most organ systems. Cardiovascular disease risk includes arrhythmias, atrial fibrillation, hypertension, coronary artery disease, and stroke. Metabolic disorders include diabetes type 2, non-alcoholic fatty liver disease. Mood disorder especially depression; and cognitive decline especially in the elderly. Associations with chronic reflux/Winkler’s esophagus some but not all inclusive. \par \par  Problem 4A: ?RLS\par -s/p : iron studies, thyroid function test, free and total testosterone level (WNL)\par - continue Mirapex .5 mg QHS \par Restless Legs Syndrome (RLS), also known as Wing-Ekbom Disease, is a common sleep -related movement disorder. About 1 in 10 adults in the [place] have problems from restless leg syndrome. It also can be seen in about 2% of children. Women are twice as likely as men to have RLS. People with RLS will have symptoms most often during times when they are less active, especially at bedtime. RLS most often causes an overwhelming urge to move your legs and sometimes other parts of your body. This urge is associated with unpleasant sensations in different parts of the body. The symptoms can be mild to severe and can affect your ability to go to sleep and stay asleep. People with RLS often sleep less at night and feel more tired during the day. \par \par Problem 5: Poor Mechanics of Breathing / Anxiety \par - Proper breathing techniques were reviewed with an emphasis of exhalation. Patient instructed to breath in for 1 second and out for four seconds. Patient was encouraged to not talk while walking. \par \par Problem 6 : Out of shape\par -Weight loss, exercise, and diet control were discussed and are highly encouraged. Treatment options were given such as, aqua therapy, and contacting a nutritionist. Recommended to use the elliptical, stationary bike, less use of treadmill.  \par \par Problem 7 : Cardiac\par -recommended to follow up with a cardiologist\par \par Problem 8 : Health Maintenance/COVID19 Precautions:\par - Clean your hands often. Wash your hands often with soap and water for at least 20 seconds, especially after blowing your nose, coughing, or sneezing, or having been in a public place.\par - If soap and water are not available, use a hand  that contains at least 60% alcohol.\par - To the extent possible, avoid touching high-touch surfaces in public places - elevator buttons, door handles, handrails, handshaking with people, etc. Use a tissue or your sleeve to cover your hand or finger if you must touch something.\par - Wash your hands after touching surfaces in public places.\par - Avoid touching your face, nose, eyes, etc.\par - Clean and disinfect your home to remove germs: practice routine cleaning of frequently touched surfaces (for example: tables, doorknobs, light switches, handles, desks, toilets, faucets, sinks & cell phones)\par - Avoid crowds, especially in poorly ventilated spaces. Your risk of exposure to respiratory viruses like COVID-19 may increase in crowded, closed-in settings with little air circulation if there are people in the crowd who are sick. All patients are recommended to practice social distancing and stay at least 6 feet away from others.\par - Avoid all non-essential travel including plane trips, and especially avoid embarking on cruise ships.\par -If COVID-19 is spreading in your community, take extra measures to put distance between yourself and other people to further reduce your risk of being exposed to this new virus.\par -Stay home as much as possible.\par - Consider ways of getting food brought to your house through family, social, or commercial networks\par -Be aware that the virus has been known to live in the air up to 3 hours post exposure, cardboard up to 24 hours post exposure, copper up to 4 hours post exposure, steel and plastic up to 2-3 days post exposure. Risk of transmission from these surfaces are affected by many variables.\par Immune Support Recommendations:\par -OTC Vitamin C 500mg BID \par -OTC Quercetin 250-500mg BID \par -OTC Zinc 75-100mg per day \par -OTC Melatonin 1 or 2 mg a night \par -OTC Vitamin D 1-4000mg per day \par -OTC Tonic Water 8oz per day\par Asthma and COVID19:\par You need to make sure your asthma is under control. This often requires the use of inhaled corticosteroids (and sometimes oral corticosteroids). Inhaled corticosteroids do not likely reduce your immune system’s ability to fight infections, but oral corticosteroids may. It is important to use the steps above to protect yourself to limit your exposure to any respiratory virus.\par \par Problem 9 : health maintenance\par -s/p influenza vaccine 2020\par -recommended strep pneumonia vaccines after age 65: Prevnar-13 vaccine, followed by Pneumo vaccine 23 one year following (completed )\par -recommended early intervention for URIs\par -recommended regular osteoporosis evaluations\par -recommended early dermatological evaluations\par -recommended after the age of 50 to the age of 70, colonoscopy every 5 years \par \par  Follow up in 6-8 weeks\par -he  is recommended to call with any changes, questions, or concerns.

## 2021-02-10 NOTE — PROCEDURE
[FreeTextEntry1] : FENO was 17; a normal value being less than 25\par Fractional exhaled nitric oxide (FENO) is regarded as a simple, noninvasive method for assessing eosinophilic airway inflammation. Produced by a variety of cells within the lung, nitric oxide (NO) concentrations are generally low in healthy individuals. However, high concentrations of NO appear to be involved in nonspecific host defense mechanisms and chronic inflammatory diseases such as asthma. The American Thoracic Society (ATS) therefore has recommended using FENO to aid in the diagnosis and monitoring of eosinophilic airway inflammation and asthma, and for identifying steroid responsive individuals whose chronic respiratory symptoms may be caused by airway inflammation. \par \par 6 minute walk test reveals a low saturation of 95% with slight evidence of dyspnea or fatigue; walked   586.8   meters.\par \par PFT revealed normal flows, with a FEV1 of 2.69 L, which is 99% of predicted, normal lung volumes, and with a normal flow volume loop.\par \par -Images and procedures reviewed in detail and discussed with patient.

## 2021-05-21 ENCOUNTER — RX RENEWAL (OUTPATIENT)
Age: 71
End: 2021-05-21

## 2021-06-10 ENCOUNTER — NON-APPOINTMENT (OUTPATIENT)
Age: 71
End: 2021-06-10

## 2021-06-10 ENCOUNTER — APPOINTMENT (OUTPATIENT)
Dept: PULMONOLOGY | Facility: CLINIC | Age: 71
End: 2021-06-10
Payer: COMMERCIAL

## 2021-06-10 VITALS
BODY MASS INDEX: 27.16 KG/M2 | OXYGEN SATURATION: 98 % | TEMPERATURE: 97.6 F | WEIGHT: 163 LBS | SYSTOLIC BLOOD PRESSURE: 120 MMHG | RESPIRATION RATE: 16 BRPM | DIASTOLIC BLOOD PRESSURE: 70 MMHG | HEIGHT: 65 IN | HEART RATE: 69 BPM

## 2021-06-10 PROCEDURE — 99072 ADDL SUPL MATRL&STAF TM PHE: CPT

## 2021-06-10 PROCEDURE — 95012 NITRIC OXIDE EXP GAS DETER: CPT

## 2021-06-10 PROCEDURE — 99214 OFFICE O/P EST MOD 30 MIN: CPT | Mod: 25

## 2021-06-10 PROCEDURE — 94010 BREATHING CAPACITY TEST: CPT

## 2021-06-10 NOTE — ASSESSMENT
[FreeTextEntry1] : Mr. TAYLOR is a 71 year old male with a history of  childhood illnesses, former 40+ pack year smoker none in 11 years, some chronic back issues, lumbar radiculopathy, LPR/GERD, mild COPD, colonic polyps, [facility] who comes into the office today for follow up pulmonary follow up for SOB\par \par The patient's shortness of breath is multifactorial due to:\par -pulmonary disease \par      - COPD \par -poor breathing mechanics \par -out of shape\par -?cardiac disease \par \par Problem 1:COPD (some emphysema) - improved \par - off Singulair 10 mg QHS\par - continue Stiolto 2 puffs QD\par -add Breztri 2 inhalations BID \par -Add Daliresp 250 mg q/day \par - s/p Blood test: Alpha1 Antitrypsin level (WNL)\par -COPD is a progressive disease and although it can’t be cured , appropriate management can slow its progression, reduce frequency and severity of exacerbations, and improve symptoms and the patient quality of life. Hospitalizations are the greatest contributor to the total COPD costs and account for up to 87% of total COPD related costs. Exacerbations are the main cause of admissions and subsequently account for the 40-75% of COPD costs. Inhaled maintenance therapy reduces the incidence of exacerbations in patients with stable COPD. Incorrect inhaler use and nonadherence are major obstacles to achieving COPD treatment goals. Many COPD patients have challenges (impaired inhalation, limited dexterity, reduced cognition: that limit their ability to correctly use their COPD treatment devices resulting in reduced symptom control. Of most importance is smoking cessation and early intervention with respiratory illnesses and contemplation for pulmonary rehab to enhance quality of life. \par -Inhaler technique reviewed as well as oral hygiene techniques reviewed with patient. Avoidance of cold air, extremes of temperature, rescue inhaler should be used before exercise. Order of medication reviewed with patient. Recommended use of a cool mist humidifier in the bedroom.\par \par Problem 1A: Allergy\par -ENT Dr. Perlman\par -continue Olopatadine 0.6% 1 sniff BID\par - Environmental measures for allergies were encouraged including mattress and pillow covers, air purifier, and environmental controls.\par \par Problem 1B: ?Iron Deficiency Anemia\par -complete Blood work to check Iron studies and CBC \par \par Problem 2: LPR/ GERD \par -continue Dexilant 60mg qAM\par -Add Pepcid 40 mg QHS\par -Rule of 2s: avoid eating too much, eating too fast, eating too late, eating too spicy, eating too lousy, eating two hours before bed.\par -Things to avoid including overeating, spicy foods, tight clothing, eating within three hours of bed, this list is not all inclusive. \par -For treatment of reflux, possible options discussed including diet control, H2 blockers, PPIs, as well as coating motility agents discussed as treatment options. Timing of meals and proximity of last meal to sleep were discussed. If symptoms persist, a formal gastrointestinal evaluation is needed.\par  \par Problem 3: Lung CA screening\par - Next CT in march 2021 (overdue) \par Lung cancer screening is recommended for people between the ages of 50 and 80 with prior 20+ pack year smoking histories. There is irrefutable evidence for realization of lung cancer screening based on two large randomized control trials demonstrating relative reduction in lung cancer mortality for patients undergoing low-dose CT scanning. Risks and benefits reviewed with the patient.\par \par Problem 4: Poor sleep / (+) mild SYDNEY   (memory/concentration, metabolism, reflux)\par - s/p Home Sleep Study - c/w mild sleep apnea \par - unable to use oral appliance ; - Sleep Right / Oxy Aid / Chin Strap \par -recommended AVEO tsd \par Sleep apnea is associated with adverse clinical consequences which an affect most organ systems. Cardiovascular disease risk includes arrhythmias, atrial fibrillation, hypertension, coronary artery disease, and stroke. Metabolic disorders include diabetes type 2, non-alcoholic fatty liver disease. Mood disorder especially depression; and cognitive decline especially in the elderly. Associations with chronic reflux/Winkler’s esophagus some but not all inclusive. \par \par  Problem 4A: ?RLS\par -s/p : iron studies, thyroid function test, free and total testosterone level (WNL)\par - continue Mirapex .5 mg QHS \par Restless Legs Syndrome (RLS), also known as Wing-Ekbom Disease, is a common sleep -related movement disorder. About 1 in 10 adults in the [place] have problems from restless leg syndrome. It also can be seen in about 2% of children. Women are twice as likely as men to have RLS. People with RLS will have symptoms most often during times when they are less active, especially at bedtime. RLS most often causes an overwhelming urge to move your legs and sometimes other parts of your body. This urge is associated with unpleasant sensations in different parts of the body. The symptoms can be mild to severe and can affect your ability to go to sleep and stay asleep. People with RLS often sleep less at night and feel more tired during the day. \par \par Problem 5: Poor Mechanics of Breathing / Anxiety \par - Proper breathing techniques were reviewed with an emphasis of exhalation. Patient instructed to breath in for 1 second and out for four seconds. Patient was encouraged to not talk while walking. \par \par Problem 6 : Out of shape\par -Weight loss, exercise, and diet control were discussed and are highly encouraged. Treatment options were given such as, aqua therapy, and contacting a nutritionist. Recommended to use the elliptical, stationary bike, less use of treadmill.  \par \par Problem 7 : Cardiac\par -recommended to follow up with a cardiologist if needed\par \par Problem 8 : Health Maintenance/COVID19 Precautions:\par -s/p Pfizer COVID 19 vaccine x 2\par - Clean your hands often. Wash your hands often with soap and water for at least 20 seconds, especially after blowing your nose, coughing, or sneezing, or having been in a public place.\par - If soap and water are not available, use a hand  that contains at least 60% alcohol.\par - To the extent possible, avoid touching high-touch surfaces in public places - elevator buttons, door handles, handrails, handshaking with people, etc. Use a tissue or your sleeve to cover your hand or finger if you must touch something.\par - Wash your hands after touching surfaces in public places.\par - Avoid touching your face, nose, eyes, etc.\par - Clean and disinfect your home to remove germs: practice routine cleaning of frequently touched surfaces (for example: tables, doorknobs, light switches, handles, desks, toilets, faucets, sinks & cell phones)\par - Avoid crowds, especially in poorly ventilated spaces. Your risk of exposure to respiratory viruses like COVID-19 may increase in crowded, closed-in settings with little air circulation if there are people in the crowd who are sick. All patients are recommended to practice social distancing and stay at least 6 feet away from others.\par - Avoid all non-essential travel including plane trips, and especially avoid embarking on cruise ships.\par -If COVID-19 is spreading in your community, take extra measures to put distance between yourself and other people to further reduce your risk of being exposed to this new virus.\par -Stay home as much as possible.\par - Consider ways of getting food brought to your house through family, social, or commercial networks\par -Be aware that the virus has been known to live in the air up to 3 hours post exposure, cardboard up to 24 hours post exposure, copper up to 4 hours post exposure, steel and plastic up to 2-3 days post exposure. Risk of transmission from these surfaces are affected by many variables.\par Immune Support Recommendations:\par -OTC Vitamin C 500mg BID \par -OTC Quercetin 250-500mg BID \par -OTC Zinc 75-100mg per day \par -OTC Melatonin 1 or 2 mg a night \par -OTC Vitamin D 1-4000mg per day \par -OTC Tonic Water 8oz per day\par Asthma and COVID19:\par You need to make sure your asthma is under control. This often requires the use of inhaled corticosteroids (and sometimes oral corticosteroids). Inhaled corticosteroids do not likely reduce your immune system’s ability to fight infections, but oral corticosteroids may. It is important to use the steps above to protect yourself to limit your exposure to any respiratory virus.\par \par Problem 9 : health maintenance\par -recommended Mouth Kote Oral Spray \par -s/p influenza vaccine 2020\par -recommended strep pneumonia vaccines after age 65: Prevnar-13 vaccine, followed by Pneumo vaccine 23 one year following (completed )\par -recommended early intervention for URIs\par -recommended regular osteoporosis evaluations\par -recommended early dermatological evaluations\par -recommended after the age of 50 to the age of 70, colonoscopy every 5 years \par \par  Follow up in 6-8 weeks\par -he  is recommended to call with any changes, questions, or concerns.

## 2021-06-10 NOTE — PROCEDURE
[FreeTextEntry1] : Sleep study (6.23.2020) revealed sleep apnea with an AHI/ROMELIA of 5.1 and a low oxygen saturation of 86%\par \par PFT revealed normal flows, with a FEV1 of 2.59L, which is 93% of predicted, normal lung volumes, and with a normal flow volume loop. \par \par FENO was 18; a normal value being less than 25\par Fractional exhaled nitric oxide (FENO) is regarded as a simple, noninvasive method for assessing eosinophilic airway inflammation. Produced by a variety of cells within the lung, nitric oxide (NO) concentrations are generally low in healthy individuals. However, high concentrations of NO appear to be involved in nonspecific host defense mechanisms and chronic inflammatory diseases such as asthma. The American Thoracic Society (ATS) therefore has recommended using FENO to aid in the diagnosis and monitoring of eosinophilic airway inflammation and asthma, and for identifying steroid responsive individuals whose chronic respiratory symptoms may be caused by airway inflammation.

## 2021-06-10 NOTE — HISTORY OF PRESENT ILLNESS
[TextBox_4] : Mr. TAYLOR is a 71 year old male presenting to the office today for a follow up pulmonary follow up. His chief complaint is\par \par -he notes s/p 7.5 hours colon surgery with pre-cancerous polyps removed with planned follow up within a year\par -he notes SOB exacerbated by humidity, exertion, but even triggered while sedentary watcihng tv onset many years ago\par -he notes exercise walking\par -he notes regular bowel movements \par -he notes intermittent cough residual to meals\par -he notes intermittent laryngitis flairs exacerbated by extended periods talking, teaching at work\par -he notes sleep quality stable\par -he notes intermittent issues staying asleep\par -he denies restless legs while sleeping\par -he notes self DC montelukast\par -he denies PND\par \par -denies any fever, chills, sweats, chest pain, chest pressure, diarrhea, constipation, dysphagia, sour taste in the mouth, dizziness, leg swelling, leg pain, myalgias, arthralgias, itchy eyes, itchy ears, heartburn, or reflux.\par \par

## 2021-06-10 NOTE — ADDENDUM
[FreeTextEntry1] : Documented by Geraldo Evans acting as a scribe for Dr. Grzegorz Corado on 06/10/2021.\par \par All medical record entries made by the Scribe were at my, Dr. Grzegorz Corado's, direction and personally dictated by me on 06/10/2021 . I have reviewed the chart and agree that the record accurately reflects my personal performance of the history, physical exam, assessment and plan. I have also personally directed, reviewed, and agree with the discharge instructions. \par

## 2021-09-18 ENCOUNTER — LABORATORY RESULT (OUTPATIENT)
Age: 71
End: 2021-09-18

## 2021-09-22 ENCOUNTER — APPOINTMENT (OUTPATIENT)
Dept: PULMONOLOGY | Facility: CLINIC | Age: 71
End: 2021-09-22
Payer: COMMERCIAL

## 2021-09-22 VITALS
RESPIRATION RATE: 16 BRPM | TEMPERATURE: 97.6 F | WEIGHT: 165 LBS | OXYGEN SATURATION: 98 % | HEIGHT: 65 IN | DIASTOLIC BLOOD PRESSURE: 70 MMHG | HEART RATE: 68 BPM | BODY MASS INDEX: 27.49 KG/M2 | SYSTOLIC BLOOD PRESSURE: 130 MMHG

## 2021-09-22 PROCEDURE — 99214 OFFICE O/P EST MOD 30 MIN: CPT | Mod: 25

## 2021-09-22 PROCEDURE — G0296 VISIT TO DETERM LDCT ELIG: CPT

## 2021-09-22 NOTE — HISTORY OF PRESENT ILLNESS
[TextBox_4] : Mr. TAYLOR is a 71 year old male presenting to the office today for a follow up pulmonary follow up. His chief complaint is\par \par -he notes generally fine\par -he notes use of Breztri and like it more than other inhalers\par -he notes still SOB when walking fast and on exertion \par -He notes bowels are regular \par -he notes sleep is stable and sleeps for 6 hrs on average waking up at 3 \par -he denies palpitation\par -he notes awaiting orthodontist\par -he notes energy level is stable and good\par -he notes stress with work \par -he notes lack of exercise but walking\par -he notes lower back pain is going into his leg limiting his walking.\par -he denies strecthing \par \par - He  denies any visual issues, headaches, nausea, vomiting, fever, chills, sweats, chest pains, chest pressure, diarrhea, constipation, dysphagia, dizziness, leg swelling, itchy eyes, itchy ears, heartburn, reflux, or sour taste in the mouth.

## 2021-09-22 NOTE — ASSESSMENT
[FreeTextEntry1] : Mr. TAYLOR is a 71 year old male with a history of  childhood illnesses, former 40+ pack year smoker none in 11 years, some chronic back issues, lumbar radiculopathy, LPR/GERD, mild COPD, colonic polyps, OSAS who comes into the office today for follow up pulmonary follow up for SOB\par \par The patient's shortness of breath is multifactorial due to:\par -pulmonary disease \par      - COPD \par -poor breathing mechanics \par -out of shape\par -?cardiac disease \par \par Problem 1:COPD (some emphysema) - improved /stable\par - off Singulair 10 mg QHS\par -continue Breztri 2 inhalations BID \par -Add Daliresp 250 mg q/day \par - s/p Blood test: Alpha1 Antitrypsin level (WNL)\par -COPD is a progressive disease and although it can’t be cured , appropriate management can slow its progression, reduce frequency and severity of exacerbations, and improve symptoms and the patient quality of life. Hospitalizations are the greatest contributor to the total COPD costs and account for up to 87% of total COPD related costs. Exacerbations are the main cause of admissions and subsequently account for the 40-75% of COPD costs. Inhaled maintenance therapy reduces the incidence of exacerbations in patients with stable COPD. Incorrect inhaler use and nonadherence are major obstacles to achieving COPD treatment goals. Many COPD patients have challenges (impaired inhalation, limited dexterity, reduced cognition: that limit their ability to correctly use their COPD treatment devices resulting in reduced symptom control. Of most importance is smoking cessation and early intervention with respiratory illnesses and contemplation for pulmonary rehab to enhance quality of life. \par -Inhaler technique reviewed as well as oral hygiene techniques reviewed with patient. Avoidance of cold air, extremes of temperature, rescue inhaler should be used before exercise. Order of medication reviewed with patient. Recommended use of a cool mist humidifier in the bedroom.\par \par Problem 1A: Allergy-stable \par -ENT Dr. Perlman\par -continue Olopatadine 0.6% 1 sniff BID\par - Environmental measures for allergies were encouraged including mattress and pillow covers, air purifier, and environmental controls.\par \par Problem 1B: ?Iron Deficiency Anemia\par -complete Blood work to check Iron studies and CBC \par \par Problem 2: LPR/ GERD \par -continue Dexilant 60mg qAM\par -Add Pepcid 40 mg QHS\par -Rule of 2s: avoid eating too much, eating too fast, eating too late, eating too spicy, eating too lousy, eating two hours before bed.\par -Things to avoid including overeating, spicy foods, tight clothing, eating within three hours of bed, this list is not all inclusive. \par -For treatment of reflux, possible options discussed including diet control, H2 blockers, PPIs, as well as coating motility agents discussed as treatment options. Timing of meals and proximity of last meal to sleep were discussed. If symptoms persist, a formal gastrointestinal evaluation is needed.\par  \par Problem 3: Lung CA screening\par - Next CT in march 2021 (overdue) (9/22/2021) rescripted \par Lung cancer screening is recommended for people between the ages of 50 and 80 with prior 20+ pack year smoking histories. There is irrefutable evidence for realization of lung cancer screening based on two large randomized control trials demonstrating relative reduction in lung cancer mortality for patients undergoing low-dose CT scanning. Risks and benefits reviewed with the patient.\par \par Problem 4: Poor sleep / (+) mild SYDNEY   (memory/concentration, metabolism, reflux)\par - s/p Home Sleep Study - c/w mild sleep apnea \par - unable to use oral appliance ; - Sleep Right / Oxy Aid / Chin Strap \par -recommended AVEO tsd \par Sleep apnea is associated with adverse clinical consequences which an affect most organ systems. Cardiovascular disease risk includes arrhythmias, atrial fibrillation, hypertension, coronary artery disease, and stroke. Metabolic disorders include diabetes type 2, non-alcoholic fatty liver disease. Mood disorder especially depression; and cognitive decline especially in the elderly. Associations with chronic reflux/Winkler’s esophagus some but not all inclusive. \par \par  Problem 4A: ?RLS\par -s/p : iron studies, thyroid function test, free and total testosterone level (WNL)\par - continue Mirapex .5 mg QHS \par Restless Legs Syndrome (RLS), also known as Wing-Ekbom Disease, is a common sleep -related movement disorder. About 1 in 10 adults in the [place] have problems from restless leg syndrome. It also can be seen in about 2% of children. Women are twice as likely as men to have RLS. People with RLS will have symptoms most often during times when they are less active, especially at bedtime. RLS most often causes an overwhelming urge to move your legs and sometimes other parts of your body. This urge is associated with unpleasant sensations in different parts of the body. The symptoms can be mild to severe and can affect your ability to go to sleep and stay asleep. People with RLS often sleep less at night and feel more tired during the day. \par \par Problem 5: Poor Mechanics of Breathing / Anxiety \par - Proper breathing techniques were reviewed with an emphasis of exhalation. Patient instructed to breath in for 1 second and out for four seconds. Patient was encouraged to not talk while walking. \par \par Problem 6 : Out of shape\par -Weight loss, exercise, and diet control were discussed and are highly encouraged. Treatment options were given such as, aqua therapy, and contacting a nutritionist. Recommended to use the elliptical, stationary bike, less use of treadmill.  \par \par Problem 7 : Cardiac\par -recommended to follow up with a cardiologist if needed\par \par Problem 8 : Health Maintenance/COVID19 Precautions:\par -s/p Pfizer COVID 19 vaccine x 3\par - Clean your hands often. Wash your hands often with soap and water for at least 20 seconds, especially after blowing your nose, coughing, or sneezing, or having been in a public place.\par - If soap and water are not available, use a hand  that contains at least 60% alcohol.\par - To the extent possible, avoid touching high-touch surfaces in public places - elevator buttons, door handles, handrails, handshaking with people, etc. Use a tissue or your sleeve to cover your hand or finger if you must touch something.\par - Wash your hands after touching surfaces in public places.\par - Avoid touching your face, nose, eyes, etc.\par - Clean and disinfect your home to remove germs: practice routine cleaning of frequently touched surfaces (for example: tables, doorknobs, light switches, handles, desks, toilets, faucets, sinks & cell phones)\par - Avoid crowds, especially in poorly ventilated spaces. Your risk of exposure to respiratory viruses like COVID-19 may increase in crowded, closed-in settings with little air circulation if there are people in the crowd who are sick. All patients are recommended to practice social distancing and stay at least 6 feet away from others.\par - Avoid all non-essential travel including plane trips, and especially avoid embarking on cruise ships.\par -If COVID-19 is spreading in your community, take extra measures to put distance between yourself and other people to further reduce your risk of being exposed to this new virus.\par -Stay home as much as possible.\par - Consider ways of getting food brought to your house through family, social, or commercial networks\par -Be aware that the virus has been known to live in the air up to 3 hours post exposure, cardboard up to 24 hours post exposure, copper up to 4 hours post exposure, steel and plastic up to 2-3 days post exposure. Risk of transmission from these surfaces are affected by many variables.\par Immune Support Recommendations:\par -OTC Vitamin C 500mg BID \par -OTC Quercetin 250-500mg BID \par -OTC Zinc 75-100mg per day \par -OTC Melatonin 1 or 2 mg a night \par -OTC Vitamin D 1-4000mg per day \par -OTC Tonic Water 8oz per day\par Asthma and COVID19:\par You need to make sure your asthma is under control. This often requires the use of inhaled corticosteroids (and sometimes oral corticosteroids). Inhaled corticosteroids do not likely reduce your immune system’s ability to fight infections, but oral corticosteroids may. It is important to use the steps above to protect yourself to limit your exposure to any respiratory virus.\par \par Problem 9 : health maintenance\par -recommended Mouth Kote Oral Spray \par -s/p influenza vaccine 2020\par -recommended strep pneumonia vaccines after age 65: Prevnar-13 vaccine, followed by Pneumo vaccine 23 one year following (completed )\par -recommended early intervention for URIs\par -recommended regular osteoporosis evaluations\par -recommended early dermatological evaluations\par -recommended after the age of 50 to the age of 70, colonoscopy every 5 years \par \par  Follow up in 6-8 weeks\par -he  is recommended to call with any changes, questions, or concerns.

## 2021-09-22 NOTE — PHYSICAL EXAM
[No Acute Distress] : no acute distress [Normal Oropharynx] : normal oropharynx [II] : Mallampati Class: II [Normal Appearance] : normal appearance [No Neck Mass] : no neck mass [Normal Rate/Rhythm] : normal rate/rhythm [Normal S1, S2] : normal s1, s2 [No Murmurs] : no murmurs [No Resp Distress] : no resp distress [Clear to Auscultation Bilaterally] : clear to auscultation bilaterally [No Abnormalities] : no abnormalities [Benign] : benign [Normal Gait] : normal gait [No Clubbing] : no clubbing [No Cyanosis] : no cyanosis [No Edema] : no edema [FROM] : FROM [Normal Color/ Pigmentation] : normal color/ pigmentation [No Focal Deficits] : no focal deficits [Oriented x3] : oriented x3 [Normal Affect] : normal affect [TextBox_68] : I:E ratio 1:3; clear  [TextBox_2] : lipoma left back

## 2021-09-22 NOTE — ADDENDUM
[FreeTextEntry1] : Documented by Brittaney Trejo acting as a scribe for Dr. Grzegorz Cordao on 09/22/2021 \par \par All medical record entries made by the Scribe were at my, Dr. Grzegorz Corado's, direction and personally dictated by me on 09/22/2021 . I have reviewed the chart and agree that the record accurately reflects my personal performance of the history, physical exam, assessment and plan. I have also personally directed, reviewed, and agree with the discharge instructions

## 2021-09-27 ENCOUNTER — NON-APPOINTMENT (OUTPATIENT)
Age: 71
End: 2021-09-27

## 2021-09-27 ENCOUNTER — TRANSCRIPTION ENCOUNTER (OUTPATIENT)
Age: 71
End: 2021-09-27

## 2021-11-18 ENCOUNTER — NON-APPOINTMENT (OUTPATIENT)
Age: 71
End: 2021-11-18

## 2021-11-18 VITALS — BODY MASS INDEX: 26.99 KG/M2 | HEIGHT: 65 IN | WEIGHT: 162 LBS

## 2021-11-18 DIAGNOSIS — Z87.891 PERSONAL HISTORY OF NICOTINE DEPENDENCE: ICD-10-CM

## 2021-11-18 NOTE — HISTORY OF PRESENT ILLNESS
[TextBox_13] : Referred by Dr. Grzegorz Corado.\par \par Mr. TAYLOR is a 71 year old male with a history of COPD and emphysema.\par \par He was called to review eligibility for Low-Dose CT lung cancer screening.  Reviewed and confirmed that the patient meets screening eligibility criteria:\par \par 71 years old \par \par Smoking Status: Former smoker \par \par Number of pack(s) per day: 1\par Number of years smoked: 45\par Number of pack years smokin\par \par Number of years since quitting smokin\par Quit year: \par \par Mr. TAYLOR denies any symptoms of lung cancer, including new cough, change in cough, hemoptysis, and unintentional weight loss.\par \par Mr. TAYLOR denies any personal history of lung cancer.  No lung cancer in a first degree relative.  Denies any history of occupational exposures.

## 2021-11-22 ENCOUNTER — APPOINTMENT (OUTPATIENT)
Dept: CT IMAGING | Facility: CLINIC | Age: 71
End: 2021-11-22
Payer: COMMERCIAL

## 2021-11-22 ENCOUNTER — OUTPATIENT (OUTPATIENT)
Dept: OUTPATIENT SERVICES | Facility: HOSPITAL | Age: 71
LOS: 1 days | End: 2021-11-22
Payer: COMMERCIAL

## 2021-11-22 DIAGNOSIS — Z98.89 OTHER SPECIFIED POSTPROCEDURAL STATES: Chronic | ICD-10-CM

## 2021-11-22 DIAGNOSIS — R93.89 ABNORMAL FINDINGS ON DIAGNOSTIC IMAGING OF OTHER SPECIFIED BODY STRUCTURES: ICD-10-CM

## 2021-11-22 PROCEDURE — 71271 CT THORAX LUNG CANCER SCR C-: CPT | Mod: 26

## 2021-11-22 PROCEDURE — 71271 CT THORAX LUNG CANCER SCR C-: CPT

## 2021-11-30 ENCOUNTER — NON-APPOINTMENT (OUTPATIENT)
Age: 71
End: 2021-11-30

## 2022-01-24 ENCOUNTER — APPOINTMENT (OUTPATIENT)
Dept: PULMONOLOGY | Facility: CLINIC | Age: 72
End: 2022-01-24
Payer: COMMERCIAL

## 2022-01-24 VITALS
TEMPERATURE: 97.7 F | WEIGHT: 164 LBS | BODY MASS INDEX: 27.32 KG/M2 | SYSTOLIC BLOOD PRESSURE: 120 MMHG | RESPIRATION RATE: 16 BRPM | OXYGEN SATURATION: 97 % | HEIGHT: 65 IN | DIASTOLIC BLOOD PRESSURE: 66 MMHG | HEART RATE: 66 BPM

## 2022-01-24 PROCEDURE — 99214 OFFICE O/P EST MOD 30 MIN: CPT | Mod: 25

## 2022-01-24 PROCEDURE — 94729 DIFFUSING CAPACITY: CPT

## 2022-01-24 PROCEDURE — 94727 GAS DIL/WSHOT DETER LNG VOL: CPT

## 2022-01-24 PROCEDURE — 95012 NITRIC OXIDE EXP GAS DETER: CPT

## 2022-01-24 PROCEDURE — 94010 BREATHING CAPACITY TEST: CPT

## 2022-01-24 RX ORDER — DEXLANSOPRAZOLE 60 MG/1
60 CAPSULE, DELAYED RELEASE ORAL
Qty: 30 | Refills: 0 | Status: ACTIVE | COMMUNITY
Start: 2021-12-30

## 2022-01-24 RX ORDER — POLYETHYLENE GLYCOL 3350 17 G/17G
17 POWDER, FOR SOLUTION ORAL
Qty: 238 | Refills: 0 | Status: ACTIVE | COMMUNITY
Start: 2021-12-23

## 2022-01-24 NOTE — ADDENDUM
[FreeTextEntry1] : Documented by Brittaney Trejo acting as a scribe for Dr. Grzegorz Corado on 01/24/2022 \par \par All medical record entries made by the Scribe were at my, Dr. Grzegorz Corado's, direction and personally dictated by me on 01/24/2022 . I have reviewed the chart and agree that the record accurately reflects my personal performance of the history, physical exam, assessment and plan. I have also personally directed, reviewed, and agree with the discharge instructions

## 2022-01-24 NOTE — HISTORY OF PRESENT ILLNESS
[TextBox_4] : Mr. TAYLOR is a 71 year old male presenting to the office today for a follow up pulmonary follow up. His chief complaint is\par \par -he notes feeling terrible and would like a change in inhaler\par -he notes increased SOB with minimal exertion\par -he notes needing to pull mask down to breathe when outside \par -he notes heartburn controlled with Rx\par -He notes that bowels are regular \par -he denies abnormal stool \par -he notes weight is stable \par -he notes energy level is 7/10 \par -he notes snoring when on right side and wears breath right strips \par -he denies exercise \par -he denies seeing a cardiologist \par \par -denies any visual issues, headaches, nausea, vomiting, fever, chills, sweats, chest pain, chest pressure, diarrhea, constipation, dysphagia, dizziness, leg swelling, leg pain, itchy eyes, itchy ears, heartburn, reflux, or sour taste in the mouth.

## 2022-01-24 NOTE — PHYSICAL EXAM
[No Acute Distress] : no acute distress [Normal Oropharynx] : normal oropharynx [Normal Appearance] : normal appearance [No Neck Mass] : no neck mass [Normal Rate/Rhythm] : normal rate/rhythm [Normal S1, S2] : normal s1, s2 [No Murmurs] : no murmurs [No Resp Distress] : no resp distress [Clear to Auscultation Bilaterally] : clear to auscultation bilaterally [No Abnormalities] : no abnormalities [Benign] : benign [Normal Gait] : normal gait [No Clubbing] : no clubbing [No Cyanosis] : no cyanosis [No Edema] : no edema [FROM] : FROM [Normal Color/ Pigmentation] : normal color/ pigmentation [No Focal Deficits] : no focal deficits [Oriented x3] : oriented x3 [Normal Affect] : normal affect [I] : Mallampati Class: I [TextBox_2] : lipoma left back  [TextBox_68] : I:E ratio 1:3; clear

## 2022-01-24 NOTE — ASSESSMENT
[FreeTextEntry1] : Mr. TAYLOR is a 71 year old male with a history of  childhood illnesses, former 40+ pack year smoker none in 11 years, some chronic back issues, lumbar radiculopathy, LPR/GERD, mild COPD, colonic polyps, OSAS who comes into the office today for follow up pulmonary follow up for SOB\par \par The patient's shortness of breath is multifactorial due to:\par -pulmonary disease \par      - COPD \par -poor breathing mechanics \par -out of shape\par -?cardiac disease \par \par Problem 1:COPD (some emphysema) - improved /stable\par - off Singulair 10 mg QHS\par -continue Breztri 2 inhalations BID with an AeroChamber \par -continue Daliresp 250 mg q/day \par - s/p Blood test: Alpha1 Antitrypsin level (WNL)\par -COPD is a progressive disease and although it can’t be cured , appropriate management can slow its progression, reduce frequency and severity of exacerbations, and improve symptoms and the patient quality of life. Hospitalizations are the greatest contributor to the total COPD costs and account for up to 87% of total COPD related costs. Exacerbations are the main cause of admissions and subsequently account for the 40-75% of COPD costs. Inhaled maintenance therapy reduces the incidence of exacerbations in patients with stable COPD. Incorrect inhaler use and nonadherence are major obstacles to achieving COPD treatment goals. Many COPD patients have challenges (impaired inhalation, limited dexterity, reduced cognition: that limit their ability to correctly use their COPD treatment devices resulting in reduced symptom control. Of most importance is smoking cessation and early intervention with respiratory illnesses and contemplation for pulmonary rehab to enhance quality of life. \par -Inhaler technique reviewed as well as oral hygiene techniques reviewed with patient. Avoidance of cold air, extremes of temperature, rescue inhaler should be used before exercise. Order of medication reviewed with patient. Recommended use of a cool mist humidifier in the bedroom.\par \par Problem 1A: Allergy-stable \par -ENT Dr. Perlman\par -continue Olopatadine 0.6% 1 sniff BID\par - Environmental measures for allergies were encouraged including mattress and pillow covers, air purifier, and environmental controls.\par \par Problem 1B: ?Iron Deficiency Anemia\par -s/p Blood work to check Iron studies and CBC - WNL elvated Hgb 17.1\par \par Problem 2: LPR/ GERD \par -continue Dexilant 60mg qAM\par -continue Pepcid 40 mg QHS\par -Rule of 2s: avoid eating too much, eating too fast, eating too late, eating too spicy, eating too lousy, eating two hours before bed.\par -Things to avoid including overeating, spicy foods, tight clothing, eating within three hours of bed, this list is not all inclusive. \par -For treatment of reflux, possible options discussed including diet control, H2 blockers, PPIs, as well as coating motility agents discussed as treatment options. Timing of meals and proximity of last meal to sleep were discussed. If symptoms persist, a formal gastrointestinal evaluation is needed.\par  \par Problem 3: Lung CA screening\par - Next CT 11/2022\par Lung cancer screening is recommended for people between the ages of 50 and 80 with prior 20+ pack year smoking histories. There is irrefutable evidence for realization of lung cancer screening based on two large randomized control trials demonstrating relative reduction in lung cancer mortality for patients undergoing low-dose CT scanning. Risks and benefits reviewed with the patient.\par \par Problem 4: Poor sleep / (+) mild SYDNEY   (memory/concentration, metabolism, reflux)\par - s/p Home Sleep Study - c/w mild sleep apnea \par - unable to use oral appliance ; - Sleep Right / Oxy Aid / Chin Strap \par -recommended AVEO tsd \par Sleep apnea is associated with adverse clinical consequences which an affect most organ systems. Cardiovascular disease risk includes arrhythmias, atrial fibrillation, hypertension, coronary artery disease, and stroke. Metabolic disorders include diabetes type 2, non-alcoholic fatty liver disease. Mood disorder especially depression; and cognitive decline especially in the elderly. Associations with chronic reflux/Winkler’s esophagus some but not all inclusive. \par \par  Problem 4A: ?RLS\par -s/p : iron studies, thyroid function test, free and total testosterone level (WNL)\par - continue Mirapex .5 mg QHS \par Restless Legs Syndrome (RLS), also known as Wing-Ekbom Disease, is a common sleep -related movement disorder. About 1 in 10 adults in the [place] have problems from restless leg syndrome. It also can be seen in about 2% of children. Women are twice as likely as men to have RLS. People with RLS will have symptoms most often during times when they are less active, especially at bedtime. RLS most often causes an overwhelming urge to move your legs and sometimes other parts of your body. This urge is associated with unpleasant sensations in different parts of the body. The symptoms can be mild to severe and can affect your ability to go to sleep and stay asleep. People with RLS often sleep less at night and feel more tired during the day. \par \par Problem 5: Poor Mechanics of Breathing / Anxiety \par -recommended Wim Hof and Buteyko breathing techniques \par - Proper breathing techniques were reviewed with an emphasis of exhalation. Patient instructed to breath in for 1 second and out for four seconds. Patient was encouraged to not talk while walking. \par \par Problem 6 : Out of shape\par -Weight loss, exercise, and diet control were discussed and are highly encouraged. Treatment options were given such as, aqua therapy, and contacting a nutritionist. Recommended to use the elliptical, stationary bike, less use of treadmill.  \par \par Problem 7 : ?Cardiac\par -complete cardiac evaluation with (Dr. Mayes or Dr. Arteaga)\par \par Problem 8 : Health Maintenance/COVID19 Precautions:\par -s/p Pfizer COVID 19 vaccine x 3\par - Clean your hands often. Wash your hands often with soap and water for at least 20 seconds, especially after blowing your nose, coughing, or sneezing, or having been in a public place.\par - If soap and water are not available, use a hand  that contains at least 60% alcohol.\par - To the extent possible, avoid touching high-touch surfaces in public places - elevator buttons, door handles, handrails, handshaking with people, etc. Use a tissue or your sleeve to cover your hand or finger if you must touch something.\par - Wash your hands after touching surfaces in public places.\par - Avoid touching your face, nose, eyes, etc.\par - Clean and disinfect your home to remove germs: practice routine cleaning of frequently touched surfaces (for example: tables, doorknobs, light switches, handles, desks, toilets, faucets, sinks & cell phones)\par - Avoid crowds, especially in poorly ventilated spaces. Your risk of exposure to respiratory viruses like COVID-19 may increase in crowded, closed-in settings with little air circulation if there are people in the crowd who are sick. All patients are recommended to practice social distancing and stay at least 6 feet away from others.\par - Avoid all non-essential travel including plane trips, and especially avoid embarking on cruise ships.\par -If COVID-19 is spreading in your community, take extra measures to put distance between yourself and other people to further reduce your risk of being exposed to this new virus.\par -Stay home as much as possible.\par - Consider ways of getting food brought to your house through family, social, or commercial networks\par -Be aware that the virus has been known to live in the air up to 3 hours post exposure, cardboard up to 24 hours post exposure, copper up to 4 hours post exposure, steel and plastic up to 2-3 days post exposure. Risk of transmission from these surfaces are affected by many variables.\par Immune Support Recommendations:\par -OTC Vitamin C 500mg BID \par -OTC Quercetin 250-500mg BID \par -OTC Zinc 75-100mg per day \par -OTC Melatonin 1 or 2 mg a night \par -OTC Vitamin D 1-4000mg per day \par -OTC Tonic Water 8oz per day\par Asthma and COVID19:\par You need to make sure your asthma is under control. This often requires the use of inhaled corticosteroids (and sometimes oral corticosteroids). Inhaled corticosteroids do not likely reduce your immune system’s ability to fight infections, but oral corticosteroids may. It is important to use the steps above to protect yourself to limit your exposure to any respiratory virus.\par \par Problem 9 : health maintenance\par -recommended Mouth Kote Oral Spray \par -s/p influenza vaccine 2021\par -recommended strep pneumonia vaccines after age 65: Prevnar-13 vaccine, followed by Pneumo vaccine 23 one year following (completed )\par -recommended early intervention for URIs\par -recommended regular osteoporosis evaluations\par -recommended early dermatological evaluations\par -recommended after the age of 50 to the age of 70, colonoscopy every 5 years \par \par  Follow up in 6-8 weeks\par -he  is recommended to call with any changes, questions, or concerns.

## 2022-01-26 ENCOUNTER — NON-APPOINTMENT (OUTPATIENT)
Age: 72
End: 2022-01-26

## 2022-02-02 ENCOUNTER — NON-APPOINTMENT (OUTPATIENT)
Age: 72
End: 2022-02-02

## 2022-02-02 ENCOUNTER — APPOINTMENT (OUTPATIENT)
Dept: CARDIOLOGY | Facility: CLINIC | Age: 72
End: 2022-02-02
Payer: COMMERCIAL

## 2022-02-02 VITALS
HEART RATE: 71 BPM | HEIGHT: 65 IN | WEIGHT: 164 LBS | OXYGEN SATURATION: 95 % | DIASTOLIC BLOOD PRESSURE: 81 MMHG | SYSTOLIC BLOOD PRESSURE: 133 MMHG | BODY MASS INDEX: 27.32 KG/M2

## 2022-02-02 PROCEDURE — 93000 ELECTROCARDIOGRAM COMPLETE: CPT

## 2022-02-02 PROCEDURE — 99244 OFF/OP CNSLTJ NEW/EST MOD 40: CPT

## 2022-02-02 RX ORDER — TIOTROPIUM BROMIDE AND OLODATEROL 3.124; 2.736 UG/1; UG/1
2.5-2.5 SPRAY, METERED RESPIRATORY (INHALATION) DAILY
Qty: 3 | Refills: 1 | Status: DISCONTINUED | COMMUNITY
Start: 2021-03-26 | End: 2022-02-02

## 2022-02-02 RX ORDER — PRAMIPEXOLE DIHYDROCHLORIDE 0.5 MG/1
0.5 TABLET ORAL
Qty: 30 | Refills: 2 | Status: DISCONTINUED | COMMUNITY
Start: 2020-06-09 | End: 2022-02-02

## 2022-02-02 RX ORDER — TIOTROPIUM BROMIDE AND OLODATEROL 3.124; 2.736 UG/1; UG/1
2.5-2.5 SPRAY, METERED RESPIRATORY (INHALATION)
Qty: 3 | Refills: 1 | Status: DISCONTINUED | COMMUNITY
Start: 2021-03-26 | End: 2022-02-02

## 2022-02-02 RX ORDER — MONTELUKAST 10 MG/1
10 TABLET, FILM COATED ORAL
Qty: 1 | Refills: 1 | Status: DISCONTINUED | COMMUNITY
Start: 2021-01-05 | End: 2022-02-02

## 2022-02-02 RX ORDER — PANTOPRAZOLE SODIUM 40 MG/1
40 TABLET, DELAYED RELEASE ORAL
Refills: 0 | Status: DISCONTINUED | COMMUNITY
End: 2022-02-02

## 2022-02-02 RX ORDER — TIOTROPIUM BROMIDE 18 UG/1
18 CAPSULE ORAL; RESPIRATORY (INHALATION)
Refills: 0 | Status: DISCONTINUED | COMMUNITY
End: 2022-02-02

## 2022-02-02 NOTE — HISTORY OF PRESENT ILLNESS
[FreeTextEntry1] : Jason presented to the office today for a cardiovascular evaluation. He presents for the further evaluation of dyspnea.\par \par He is now 71 years old, with a history of smoking in the past, with associated COPD. He does not have hypertension, diabetes or hyperlipidemia. He is not known to have any underlying structural heart disease.\par \par He has noted progressive shortness of breath, both with exertion, as well as with activity such as bending over to pick something up. He does not report angina with activity. He denies orthopnea, PND and lower extremity edema. He denies palpitations, dizziness and syncope.\par \par He has been following with Dr. Corado of pulmonology, who has been concerned that there might be a cardiovascular component to his dyspnea, and referred him here for evaluation.

## 2022-02-02 NOTE — DISCUSSION/SUMMARY
[FreeTextEntry1] : Don has a history of smoking in the past, with associated COPD. It is not clear to what extent he might also have any underlying structural heart disease, ischemic or otherwise. Certainly exertional dyspnea can be a manifestation of structural heart disease, though shortness of breath when bending over is almost certainly a manifestation of his pulmonary process.\par \par I have suggested precautionary testing, to include an echocardiogram, as well as nuclear stress testing. I will be in contact with him to discuss the results.

## 2022-02-07 ENCOUNTER — APPOINTMENT (OUTPATIENT)
Dept: CARDIOLOGY | Facility: CLINIC | Age: 72
End: 2022-02-07
Payer: COMMERCIAL

## 2022-02-07 PROCEDURE — 93306 TTE W/DOPPLER COMPLETE: CPT

## 2022-02-11 ENCOUNTER — NON-APPOINTMENT (OUTPATIENT)
Age: 72
End: 2022-02-11

## 2022-02-14 ENCOUNTER — APPOINTMENT (OUTPATIENT)
Dept: CARDIOLOGY | Facility: CLINIC | Age: 72
End: 2022-02-14
Payer: COMMERCIAL

## 2022-02-14 PROCEDURE — 78452 HT MUSCLE IMAGE SPECT MULT: CPT

## 2022-02-14 PROCEDURE — A9500: CPT

## 2022-02-14 PROCEDURE — 93015 CV STRESS TEST SUPVJ I&R: CPT

## 2022-04-17 ENCOUNTER — RX RENEWAL (OUTPATIENT)
Age: 72
End: 2022-04-17

## 2022-04-25 ENCOUNTER — APPOINTMENT (OUTPATIENT)
Dept: PULMONOLOGY | Facility: CLINIC | Age: 72
End: 2022-04-25
Payer: COMMERCIAL

## 2022-04-25 ENCOUNTER — NON-APPOINTMENT (OUTPATIENT)
Age: 72
End: 2022-04-25

## 2022-04-25 VITALS
BODY MASS INDEX: 26.33 KG/M2 | DIASTOLIC BLOOD PRESSURE: 80 MMHG | HEIGHT: 65 IN | TEMPERATURE: 97.1 F | SYSTOLIC BLOOD PRESSURE: 110 MMHG | WEIGHT: 158 LBS | HEART RATE: 66 BPM | RESPIRATION RATE: 17 BRPM | OXYGEN SATURATION: 97 %

## 2022-04-25 PROCEDURE — 99214 OFFICE O/P EST MOD 30 MIN: CPT | Mod: 25

## 2022-04-25 PROCEDURE — 95012 NITRIC OXIDE EXP GAS DETER: CPT

## 2022-04-25 PROCEDURE — 94010 BREATHING CAPACITY TEST: CPT

## 2022-04-25 RX ORDER — ZILEUTON 600 MG/1
600 TABLET, FILM COATED, EXTENDED RELEASE ORAL
Qty: 360 | Refills: 1 | Status: ACTIVE | COMMUNITY
Start: 2022-04-25 | End: 1900-01-01

## 2022-04-25 RX ORDER — INHALER, ASSIST DEVICES
SPACER (EA) MISCELLANEOUS
Qty: 1 | Refills: 2 | Status: ACTIVE | COMMUNITY
Start: 2022-01-24 | End: 1900-01-01

## 2022-04-25 NOTE — PROCEDURE
[FreeTextEntry1] : PFT revealed normal flows, with a FEV1 of 2.47L,which is 89% of predicted with a normal flow volume loop \par \par FENO was 15 ; a normal value being less than 25\par Fractional exhaled nitric oxide (FENO) is regarded as a simple, noninvasive method for assessing eosinophilic airway inflammation. Produced by a variety of cells within the lung, nitric oxide (NO) concentrations are generally low in healthy individuals. However, high concentrations of NO appear to be involved in nonspecific host defense mechanisms and chronic inflammatory diseases such as asthma. The American Thoracic Society (ATS) therefore has recommended using FENO to aid in the diagnosis and monitoring of eosinophilic airway inflammation and asthma, and for identifying steroid responsive individuals whose chronic respiratory symptoms may be caused by airway inflammation.

## 2022-04-25 NOTE — PHYSICAL EXAM
[No Acute Distress] : no acute distress [Normal Oropharynx] : normal oropharynx [I] : Mallampati Class: I [Normal Appearance] : normal appearance [No Neck Mass] : no neck mass [Normal Rate/Rhythm] : normal rate/rhythm [Normal S1, S2] : normal s1, s2 [No Murmurs] : no murmurs [No Resp Distress] : no resp distress [Clear to Auscultation Bilaterally] : clear to auscultation bilaterally [No Abnormalities] : no abnormalities [Benign] : benign [Normal Gait] : normal gait [No Clubbing] : no clubbing [No Cyanosis] : no cyanosis [No Edema] : no edema [FROM] : FROM [Normal Color/ Pigmentation] : normal color/ pigmentation [No Focal Deficits] : no focal deficits [Oriented x3] : oriented x3 [Normal Affect] : normal affect [TextBox_2] : lipoma left back  [TextBox_68] : I:E ratio 1:3; clear

## 2022-04-25 NOTE — ASSESSMENT
[FreeTextEntry1] : Mr. TAYLOR is a 72 year old male with a history of  childhood illnesses, former 40+ pack year smoker none in 11 years, some chronic back issues, lumbar radiculopathy, LPR/GERD, mild COPD, colonic polyps, [facility] who comes into the office today for follow up pulmonary follow up for SOB( cardiac w/up negative) \par \par The patient's shortness of breath is multifactorial due to:\par -pulmonary disease \par      - COPD \par -poor breathing mechanics \par -out of shape\par -?cardiac disease \par \par Problem 1:COPD (some emphysema) - improved /stable\par - off Singulair 10 mg QHS\par -continue Breztri 2 inhalations BID with an AeroChamber \par -Boost Daliresp 250 mg q/day to Daliresp 500 \par -add Zyflo CR 1200 BID \par - s/p Blood test: Alpha1 Antitrypsin level (WNL)\par -COPD is a progressive disease and although it can’t be cured , appropriate management can slow its progression, reduce frequency and severity of exacerbations, and improve symptoms and the patient quality of life. Hospitalizations are the greatest contributor to the total COPD costs and account for up to 87% of total COPD related costs. Exacerbations are the main cause of admissions and subsequently account for the 40-75% of COPD costs. Inhaled maintenance therapy reduces the incidence of exacerbations in patients with stable COPD. Incorrect inhaler use and nonadherence are major obstacles to achieving COPD treatment goals. Many COPD patients have challenges (impaired inhalation, limited dexterity, reduced cognition: that limit their ability to correctly use their COPD treatment devices resulting in reduced symptom control. Of most importance is smoking cessation and early intervention with respiratory illnesses and contemplation for pulmonary rehab to enhance quality of life. \par -Inhaler technique reviewed as well as oral hygiene techniques reviewed with patient. Avoidance of cold air, extremes of temperature, rescue inhaler should be used before exercise. Order of medication reviewed with patient. Recommended use of a cool mist humidifier in the bedroom.\par \par Problem 1A: Allergy-stable \par -ENT Dr. Perlman/ [place] \par -continue Olopatadine 0.6% 1 sniff BID\par - Environmental measures for allergies were encouraged including mattress and pillow covers, air purifier, and environmental controls.\par \par Problem 1B: ?Iron Deficiency Anemia\par -s/p Blood work to check Iron studies and CBC - WNL elvated Hgb 17.1\par \par Problem 2: LPR/ GERD \par -continue Dexilant 60mg qAM\par -continue Pepcid 40 mg QHS\par -Rule of 2s: avoid eating too much, eating too fast, eating too late, eating too spicy, eating too lousy, eating two hours before bed.\par -Things to avoid including overeating, spicy foods, tight clothing, eating within three hours of bed, this list is not all inclusive. \par -For treatment of reflux, possible options discussed including diet control, H2 blockers, PPIs, as well as coating motility agents discussed as treatment options. Timing of meals and proximity of last meal to sleep were discussed. If symptoms persist, a formal gastrointestinal evaluation is needed.\par  \par Problem 3: Lung CA screening\par - Next CT 11/2022\par Lung cancer screening is recommended for people between the ages of 50 and 80 with prior 20+ pack year smoking histories. There is irrefutable evidence for realization of lung cancer screening based on two large randomized control trials demonstrating relative reduction in lung cancer mortality for patients undergoing low-dose CT scanning. Risks and benefits reviewed with the patient.\par \par Problem 4: Poor sleep / (+) mild SYDNEY   (memory/concentration, metabolism, reflux)\par - s/p Home Sleep Study - c/w mild sleep apnea \par - unable to use oral appliance ; - Sleep Right / Oxy Aid / Chin Strap \par -recommended AVEO tsd \par Sleep apnea is associated with adverse clinical consequences which an affect most organ systems. Cardiovascular disease risk includes arrhythmias, atrial fibrillation, hypertension, coronary artery disease, and stroke. Metabolic disorders include diabetes type 2, non-alcoholic fatty liver disease. Mood disorder especially depression; and cognitive decline especially in the elderly. Associations with chronic reflux/Winkler’s esophagus some but not all inclusive. \par \par  Problem 4A: ?RLS\par -s/p : iron studies, thyroid function test, free and total testosterone level (WNL)\par - continue Mirapex .5 mg QHS \par Restless Legs Syndrome (RLS), also known as Wing-Ekbom Disease, is a common sleep -related movement disorder. About 1 in 10 adults in the [place] have problems from restless leg syndrome. It also can be seen in about 2% of children. Women are twice as likely as men to have RLS. People with RLS will have symptoms most often during times when they are less active, especially at bedtime. RLS most often causes an overwhelming urge to move your legs and sometimes other parts of your body. This urge is associated with unpleasant sensations in different parts of the body. The symptoms can be mild to severe and can affect your ability to go to sleep and stay asleep. People with RLS often sleep less at night and feel more tired during the day. \par \par Problem 5: Poor Mechanics of Breathing / Anxiety \par -recommended Wim Hof and Buteyko breathing techniques \par - Proper breathing techniques were reviewed with an emphasis of exhalation. Patient instructed to breath in for 1 second and out for four seconds. Patient was encouraged to not talk while walking. \par \par Problem 6 : Out of shape\par -Weight loss, exercise, and diet control were discussed and are highly encouraged. Treatment options were given such as, aqua therapy, and contacting a nutritionist. Recommended to use the elliptical, stationary bike, less use of treadmill.  \par \par Problem 7 : ?Cardiac\par -s/p cardiac evaluation with (Dr. Mayes)\par \par Problem 8 : Health Maintenance/COVID19 Precautions:\par -s/p Pfizer COVID 19 vaccine x 4\par - Clean your hands often. Wash your hands often with soap and water for at least 20 seconds, especially after blowing your nose, coughing, or sneezing, or having been in a public place.\par - If soap and water are not available, use a hand  that contains at least 60% alcohol.\par - To the extent possible, avoid touching high-touch surfaces in public places - elevator buttons, door handles, handrails, handshaking with people, etc. Use a tissue or your sleeve to cover your hand or finger if you must touch something.\par - Wash your hands after touching surfaces in public places.\par - Avoid touching your face, nose, eyes, etc.\par - Clean and disinfect your home to remove germs: practice routine cleaning of frequently touched surfaces (for example: tables, doorknobs, light switches, handles, desks, toilets, faucets, sinks & cell phones)\par - Avoid crowds, especially in poorly ventilated spaces. Your risk of exposure to respiratory viruses like COVID-19 may increase in crowded, closed-in settings with little air circulation if there are people in the crowd who are sick. All patients are recommended to practice social distancing and stay at least 6 feet away from others.\par - Avoid all non-essential travel including plane trips, and especially avoid embarking on cruise ships.\par -If COVID-19 is spreading in your community, take extra measures to put distance between yourself and other people to further reduce your risk of being exposed to this new virus.\par -Stay home as much as possible.\par - Consider ways of getting food brought to your house through family, social, or commercial networks\par -Be aware that the virus has been known to live in the air up to 3 hours post exposure, cardboard up to 24 hours post exposure, copper up to 4 hours post exposure, steel and plastic up to 2-3 days post exposure. Risk of transmission from these surfaces are affected by many variables.\par Immune Support Recommendations:\par -OTC Vitamin C 500mg BID \par -OTC Quercetin 250-500mg BID \par -OTC Zinc 75-100mg per day \par -OTC Melatonin 1 or 2 mg a night \par -OTC Vitamin D 1-4000mg per day \par -OTC Tonic Water 8oz per day\par Asthma and COVID19:\par You need to make sure your asthma is under control. This often requires the use of inhaled corticosteroids (and sometimes oral corticosteroids). Inhaled corticosteroids do not likely reduce your immune system’s ability to fight infections, but oral corticosteroids may. It is important to use the steps above to protect yourself to limit your exposure to any respiratory virus.\par \par Problem 9 : health maintenance\par -recommended Mouth Kote Oral Spray \par -s/p influenza vaccine 2021\par -recommended strep pneumonia vaccines after age 65: Prevnar-13 vaccine, followed by Pneumo vaccine 23 one year following (completed )\par -recommended early intervention for URIs\par -recommended regular osteoporosis evaluations\par -recommended early dermatological evaluations\par -recommended after the age of 50 to the age of 70, colonoscopy every 5 years \par \par  Follow up in 6-8 weeks\par -he  is recommended to call with any changes, questions, or concerns.

## 2022-04-25 NOTE — HISTORY OF PRESENT ILLNESS
[TextBox_4] : Mr. TYALOR is a 72 year old male presenting to the office today for a follow up pulmonary follow up. His chief complaint is\par \par -he notes energy below baseline \par -he notes unable to tolerate Breztri \par -he notes persistent SOB\par -he notes walking at a very brisk pace with exacerbates SOB\par -he notes intermittent wheezing\par -he notes s/p cardiac workup and experienced anxiety and feelings of syncope during the test\par -he notes chest pressure that resolved after a few seconds during the cardiac stress test \par -he notes weight has slightly decreased\par -He notes that bowels are regular  \par -he notes good quality of sleep \par -he notes when in supine position, he has issues breathing so he sleeps on his side \par -he denies exercise  \par -he notes s/p colonoscopy which revealed 6 new polyps and awaiting follow up in a year \par -he notes constant dysphonia and considering voice therapy in the future \par -he notes intermittent dysphagia \par -he notes unable to tolerate Daliresp at night \par \par -denies any visual issues, headaches, vomiting, fever, chills, sweats, diarrhea, constipation, dizziness, leg swelling, leg pain, itchy eyes, itchy ears, heartburn, reflux, or sour taste in the mouth.

## 2022-04-29 ENCOUNTER — NON-APPOINTMENT (OUTPATIENT)
Age: 72
End: 2022-04-29

## 2022-07-10 ENCOUNTER — EMERGENCY (EMERGENCY)
Facility: HOSPITAL | Age: 72
LOS: 1 days | Discharge: HOME | End: 2022-07-10
Attending: EMERGENCY MEDICINE | Admitting: EMERGENCY MEDICINE
Payer: COMMERCIAL

## 2022-07-10 VITALS
SYSTOLIC BLOOD PRESSURE: 138 MMHG | TEMPERATURE: 98 F | RESPIRATION RATE: 18 BRPM | DIASTOLIC BLOOD PRESSURE: 64 MMHG | OXYGEN SATURATION: 97 % | HEART RATE: 86 BPM

## 2022-07-10 VITALS
OXYGEN SATURATION: 94 % | SYSTOLIC BLOOD PRESSURE: 153 MMHG | HEIGHT: 66 IN | RESPIRATION RATE: 20 BRPM | WEIGHT: 164.91 LBS | DIASTOLIC BLOOD PRESSURE: 71 MMHG | HEART RATE: 104 BPM | TEMPERATURE: 103 F

## 2022-07-10 DIAGNOSIS — Z98.89 OTHER SPECIFIED POSTPROCEDURAL STATES: Chronic | ICD-10-CM

## 2022-07-10 LAB
ALBUMIN SERPL ELPH-MCNC: 3.4 G/DL — SIGNIFICANT CHANGE UP (ref 3.3–5)
ALP SERPL-CCNC: 88 U/L — SIGNIFICANT CHANGE UP (ref 40–120)
ALT FLD-CCNC: 42 U/L — SIGNIFICANT CHANGE UP (ref 12–78)
ANION GAP SERPL CALC-SCNC: 7 MMOL/L — SIGNIFICANT CHANGE UP (ref 5–17)
APTT BLD: 33.9 SEC — SIGNIFICANT CHANGE UP (ref 27.5–35.5)
AST SERPL-CCNC: 32 U/L — SIGNIFICANT CHANGE UP (ref 15–37)
BASOPHILS # BLD AUTO: 0.04 K/UL — SIGNIFICANT CHANGE UP (ref 0–0.2)
BASOPHILS NFR BLD AUTO: 0.4 % — SIGNIFICANT CHANGE UP (ref 0–2)
BILIRUB SERPL-MCNC: 0.6 MG/DL — SIGNIFICANT CHANGE UP (ref 0.2–1.2)
BUN SERPL-MCNC: 14 MG/DL — SIGNIFICANT CHANGE UP (ref 7–23)
CALCIUM SERPL-MCNC: 9.1 MG/DL — SIGNIFICANT CHANGE UP (ref 8.5–10.1)
CHLORIDE SERPL-SCNC: 105 MMOL/L — SIGNIFICANT CHANGE UP (ref 96–108)
CO2 SERPL-SCNC: 26 MMOL/L — SIGNIFICANT CHANGE UP (ref 22–31)
CREAT SERPL-MCNC: 0.97 MG/DL — SIGNIFICANT CHANGE UP (ref 0.5–1.3)
EGFR: 84 ML/MIN/1.73M2 — SIGNIFICANT CHANGE UP
EOSINOPHIL # BLD AUTO: 0.02 K/UL — SIGNIFICANT CHANGE UP (ref 0–0.5)
EOSINOPHIL NFR BLD AUTO: 0.2 % — SIGNIFICANT CHANGE UP (ref 0–6)
GLUCOSE SERPL-MCNC: 113 MG/DL — HIGH (ref 70–99)
HCT VFR BLD CALC: 48.5 % — SIGNIFICANT CHANGE UP (ref 39–50)
HGB BLD-MCNC: 16.4 G/DL — SIGNIFICANT CHANGE UP (ref 13–17)
IMM GRANULOCYTES NFR BLD AUTO: 0.4 % — SIGNIFICANT CHANGE UP (ref 0–1.5)
INR BLD: 1.18 RATIO — HIGH (ref 0.88–1.16)
LACTATE SERPL-SCNC: 1.3 MMOL/L — SIGNIFICANT CHANGE UP (ref 0.7–2)
LYMPHOCYTES # BLD AUTO: 0.83 K/UL — LOW (ref 1–3.3)
LYMPHOCYTES # BLD AUTO: 8.9 % — LOW (ref 13–44)
MCHC RBC-ENTMCNC: 31.2 PG — SIGNIFICANT CHANGE UP (ref 27–34)
MCHC RBC-ENTMCNC: 33.8 GM/DL — SIGNIFICANT CHANGE UP (ref 32–36)
MCV RBC AUTO: 92.4 FL — SIGNIFICANT CHANGE UP (ref 80–100)
MONOCYTES # BLD AUTO: 0.88 K/UL — SIGNIFICANT CHANGE UP (ref 0–0.9)
MONOCYTES NFR BLD AUTO: 9.4 % — SIGNIFICANT CHANGE UP (ref 2–14)
NEUTROPHILS # BLD AUTO: 7.55 K/UL — HIGH (ref 1.8–7.4)
NEUTROPHILS NFR BLD AUTO: 80.7 % — HIGH (ref 43–77)
NRBC # BLD: 0 /100 WBCS — SIGNIFICANT CHANGE UP (ref 0–0)
PLATELET # BLD AUTO: 246 K/UL — SIGNIFICANT CHANGE UP (ref 150–400)
POTASSIUM SERPL-MCNC: 3.8 MMOL/L — SIGNIFICANT CHANGE UP (ref 3.5–5.3)
POTASSIUM SERPL-SCNC: 3.8 MMOL/L — SIGNIFICANT CHANGE UP (ref 3.5–5.3)
PROCALCITONIN SERPL-MCNC: 0.18 NG/ML — HIGH
PROT SERPL-MCNC: 7.8 G/DL — SIGNIFICANT CHANGE UP (ref 6–8.3)
PROTHROM AB SERPL-ACNC: 13.8 SEC — HIGH (ref 10.5–13.4)
RAPID RVP RESULT: SIGNIFICANT CHANGE UP
RBC # BLD: 5.25 M/UL — SIGNIFICANT CHANGE UP (ref 4.2–5.8)
RBC # FLD: 13.2 % — SIGNIFICANT CHANGE UP (ref 10.3–14.5)
SARS-COV-2 RNA SPEC QL NAA+PROBE: SIGNIFICANT CHANGE UP
SODIUM SERPL-SCNC: 138 MMOL/L — SIGNIFICANT CHANGE UP (ref 135–145)
WBC # BLD: 9.36 K/UL — SIGNIFICANT CHANGE UP (ref 3.8–10.5)
WBC # FLD AUTO: 9.36 K/UL — SIGNIFICANT CHANGE UP (ref 3.8–10.5)

## 2022-07-10 PROCEDURE — 84145 PROCALCITONIN (PCT): CPT

## 2022-07-10 PROCEDURE — 0225U NFCT DS DNA&RNA 21 SARSCOV2: CPT

## 2022-07-10 PROCEDURE — 85730 THROMBOPLASTIN TIME PARTIAL: CPT

## 2022-07-10 PROCEDURE — 71045 X-RAY EXAM CHEST 1 VIEW: CPT

## 2022-07-10 PROCEDURE — 71250 CT THORAX DX C-: CPT | Mod: MA

## 2022-07-10 PROCEDURE — 99285 EMERGENCY DEPT VISIT HI MDM: CPT

## 2022-07-10 PROCEDURE — 85025 COMPLETE CBC W/AUTO DIFF WBC: CPT

## 2022-07-10 PROCEDURE — 93005 ELECTROCARDIOGRAM TRACING: CPT

## 2022-07-10 PROCEDURE — 36415 COLL VENOUS BLD VENIPUNCTURE: CPT

## 2022-07-10 PROCEDURE — 96374 THER/PROPH/DIAG INJ IV PUSH: CPT

## 2022-07-10 PROCEDURE — 85610 PROTHROMBIN TIME: CPT

## 2022-07-10 PROCEDURE — 87040 BLOOD CULTURE FOR BACTERIA: CPT

## 2022-07-10 PROCEDURE — 93010 ELECTROCARDIOGRAM REPORT: CPT

## 2022-07-10 PROCEDURE — 80053 COMPREHEN METABOLIC PANEL: CPT

## 2022-07-10 PROCEDURE — 71250 CT THORAX DX C-: CPT | Mod: 26,MA

## 2022-07-10 PROCEDURE — 99285 EMERGENCY DEPT VISIT HI MDM: CPT | Mod: 25

## 2022-07-10 PROCEDURE — 83605 ASSAY OF LACTIC ACID: CPT

## 2022-07-10 PROCEDURE — 71045 X-RAY EXAM CHEST 1 VIEW: CPT | Mod: 26

## 2022-07-10 RX ORDER — SODIUM CHLORIDE 9 MG/ML
2300 INJECTION, SOLUTION INTRAVENOUS ONCE
Refills: 0 | Status: COMPLETED | OUTPATIENT
Start: 2022-07-10 | End: 2022-07-10

## 2022-07-10 RX ORDER — PHENOL/SODIUM PHENOLATE
2 AEROSOL, SPRAY (ML) MUCOUS MEMBRANE ONCE
Refills: 0 | Status: COMPLETED | OUTPATIENT
Start: 2022-07-10 | End: 2022-07-10

## 2022-07-10 RX ORDER — AZITHROMYCIN 500 MG/1
1 TABLET, FILM COATED ORAL
Qty: 1 | Refills: 0
Start: 2022-07-10 | End: 2022-07-14

## 2022-07-10 RX ORDER — ACETAMINOPHEN 500 MG
650 TABLET ORAL ONCE
Refills: 0 | Status: COMPLETED | OUTPATIENT
Start: 2022-07-10 | End: 2022-07-10

## 2022-07-10 RX ORDER — CEFTRIAXONE 500 MG/1
1000 INJECTION, POWDER, FOR SOLUTION INTRAMUSCULAR; INTRAVENOUS ONCE
Refills: 0 | Status: COMPLETED | OUTPATIENT
Start: 2022-07-10 | End: 2022-07-10

## 2022-07-10 RX ADMIN — CEFTRIAXONE 100 MILLIGRAM(S): 500 INJECTION, POWDER, FOR SOLUTION INTRAMUSCULAR; INTRAVENOUS at 07:25

## 2022-07-10 RX ADMIN — Medication 2 SPRAY(S): at 09:57

## 2022-07-10 RX ADMIN — SODIUM CHLORIDE 2300 MILLILITER(S): 9 INJECTION, SOLUTION INTRAVENOUS at 07:25

## 2022-07-10 RX ADMIN — Medication 650 MILLIGRAM(S): at 08:09

## 2022-07-10 NOTE — ED PROVIDER NOTE - OBJECTIVE STATEMENT
73 yo white male presents to ED c/o 6-days of fever, chills, non productive cough and subjective funny smell that he is experiencing. Seen at [facility] and started on AbXs. Diarrheax one today. No nausea or vomiting. No dysuria. No hematuria.

## 2022-07-10 NOTE — ED PROVIDER NOTE - NSICDXPASTMEDICALHX_GEN_ALL_CORE_FT
PAST MEDICAL HISTORY:  Bilateral inguinal hernia without obstruction or gangrene, recurrence not specified     COPD, mild     Low back pain radiating to left leg     Low back pain with left-sided sciatica, unspecified back pain laterality, unspecified chronicity

## 2022-07-10 NOTE — ED PROVIDER NOTE - NSFOLLOWUPINSTRUCTIONS_ED_ALL_ED_FT
Rest  Increase fluid intake  Take CEFTIN 500 mg every 12-hours  Take Z-PACK as directed  Follow-up with your doctor this week  Return here if needed          Community-Acquired Pneumonia, Adult      Pneumonia is a lung infection that causes inflammation and the buildup of mucus and fluids in the lungs. This may cause coughing and difficulty breathing. Community-acquired pneumonia is pneumonia that develops in people who are not, and have not recently been, in a hospital or other health care facility.    Usually, pneumonia develops as a result of an illness that is caused by a virus, such as the common cold and the flu (influenza). It can also be caused by bacteria or fungi. While the common cold and influenza can pass from person to person (are contagious), pneumonia itself is not considered contagious.      What are the causes?     This condition may be caused by:  •Viruses.      •Bacteria.      •Fungi, such as molds or mushrooms.        What increases the risk?    The following factors may make you more likely to develop this condition:•Having certain medical conditions, such as:  •A long-term (chronic) disease, which may include chronic obstructive pulmonary disease (COPD), asthma, heart failure, cystic fibrosis, diabetes, kidney disease, sickle cell disease, and human immunodeficiency virus (HIV).      •A condition that increases the risk of breathing in (aspirating) mucus and other fluids from your mouth and nose.      •A weakened body defense system (immune system).        •Having had your spleen removed (splenectomy). The spleen is the organ that helps fight germs and infections.      •Not cleaning your teeth and gums well (poor dental hygiene).      •Using tobacco products.      •Traveling to places where germs that cause pneumonia are present.      •Being near certain animals, or animal habitats, that have germs that cause pneumonia.      •Being older than 65 years of age.        What are the signs or symptoms?    Symptoms of this condition include:  •A dry cough or a wet (productive) cough.      •A fever.      •Sweating or chills.      •Chest pain, especially when breathing deeply or coughing.      •Fast breathing, difficulty breathing, or shortness of breath.      •Tiredness (fatigue).      •Muscle aches.        How is this diagnosed?     This condition may be diagnosed based on your medical history or a physical exam. You may also have tests, including:  •Chest X-rays.      •Tests of the level of oxygen and other gases in your blood.    •Tests of:  •Your blood.      •Mucus from your lungs (sputum).      •Fluid around your lungs (pleural fluid).      •Your urine.        If your pneumonia is severe, other tests may be done to learn more about the cause.      How is this treated?    Treatment for this condition depends on many factors, such as the cause of your pneumonia, your medicines, and other medical conditions that you have.    For most adults, pneumonia may be treated at home. In some cases, treatment must happen in a hospital and may include:•Medicines that are given by mouth (orally) or through an IV, including:  •Antibiotic medicines, if bacteria caused the pneumonia.      •Medicines that kill viruses (antiviral medicines), if a virus caused the pneumonia.        •Oxygen therapy.      Severe pneumonia, although rare, may require the following treatments:  •Mechanical ventilation.This procedure uses a machine to help you breathe if you cannot breathe well on your own or maintain a safe level of blood oxygen.      •Thoracentesis. This procedure removes any buildup of pleural fluid to help with breathing.        Follow these instructions at home:     Medicines     •Take over-the-counter and prescription medicines only as told by your health care provider.      •Take cough medicine only if you have trouble sleeping. Cough medicine can prevent your body from removing mucus from your lungs.      •If you were prescribed an antibiotic medicine, take it as told by your health care provider. Do not stop taking the antibiotic even if you start to feel better.        Lifestyle                   • Do not drink alcohol.      • Do not use any products that contain nicotine or tobacco, such as cigarettes, e-cigarettes, and chewing tobacco. If you need help quitting, ask your health care provider.      •Eat a healthy diet. This includes plenty of vegetables, fruits, whole grains, low-fat dairy products, and lean protein.      General instructions     •Rest a lot and get at least 8 hours of sleep each night.      •Sleep in a partly upright position at night. Place a few pillows under your head or sleep in a reclining chair.      •Return to your normal activities as told by your health care provider. Ask your health care provider what activities are safe for you.      •Drink enough fluid to keep your urine pale yellow. This helps to thin the mucus in your lungs.      •If your throat is sore, gargle with a salt–water mixture 3–4 times a day or as needed. To make a salt–water mixture, completely dissolve ½–1 tsp (3–6 g) of salt in 1 cup (237 mL) of warm water.      •Keep all follow-up visits as told by your health care provider. This is important.        How is this prevented?    You can lower your risk of developing community-acquired pneumonia by:•Getting the pneumonia vaccine. There are different types and schedules of pneumonia vaccines. Ask your health care provider which option is best for you. Consider getting the pneumonia vaccine if:  •You are older than 65 years of age.      •You are 19–65 years of age and are receiving cancer treatment, have chronic lung disease, or have other medical conditions that affect your immune system. Ask your health care provider if this applies to you.        •Getting your influenza vaccine every year. Ask your health care provider which type of vaccine is best for you.      •Getting regular dental checkups.      •Washing your hands often with soap and water for at least 20 seconds. If soap and water are not available, use hand .        Contact a health care provider if you have:    •A fever.      •Trouble sleeping because you cannot control your cough with cough medicine.        Get help right away if:    •Your shortness of breath becomes worse.      •Your chest pain increases.      •Your sickness becomes worse, especially if you are an older adult or have a weak immune system.      •You cough up blood.      These symptoms may represent a serious problem that is an emergency. Do not wait to see if the symptoms will go away. Get medical help right away. Call your local emergency services (911 in the [place] Do not drive yourself to the hospital.       Summary    •Pneumonia is an infection of the lungs.      •Community-acquired pneumonia develops in people who have not been in the hospital. It can be caused by bacteria, viruses, or fungi.      •This condition may be treated with antibiotics or antiviral medicines.      •Severe pneumonia may require a hospital stay and treatment to help with breathing.      This information is not intended to replace advice given to you by your health care provider. Make sure you discuss any questions you have with your health care provider.      Document Revised: 09/29/2020 Document Reviewed: 09/29/2020    [employer] Patient Education © 2022 [employer]

## 2022-07-10 NOTE — ED ADULT NURSE NOTE - OBJECTIVE STATEMENT
Patient is a 73 yo  male presents to ED complaining of  6-days of fever, chills, non productive cough and subjective funny smell that he is experiencing. Seen at [facility] and started on AbXs. Diarrhea one episode today. No nausea or vomiting. No dysuria. No hematuria.

## 2022-07-10 NOTE — ED PROVIDER NOTE - CARE PROVIDER_API CALL
Meet Benitez)  Internal Medicine; Pulmonary Disease  [address] [place] [address]  Phone: [phone]  Fax: [phone]  Follow Up Time:

## 2022-07-10 NOTE — ED PROVIDER NOTE - PATIENT PORTAL LINK FT
You can access the FollowMyHealth Patient Portal offered by [facility] by registering at the following website: [email or web address]. By joining [employer]’s FollowMyHealth portal, you will also be able to view your health information using other applications (apps) compatible with our system.

## 2022-07-10 NOTE — ED ADULT TRIAGE NOTE - CHIEF COMPLAINT QUOTE
started for past few days, chills cough fever weakness decreased PO intake.  cefuroxime started yesterday

## 2022-07-11 PROBLEM — J44.9 CHRONIC OBSTRUCTIVE PULMONARY DISEASE, UNSPECIFIED: Chronic | Status: ACTIVE | Noted: 2022-07-10

## 2022-07-14 ENCOUNTER — APPOINTMENT (OUTPATIENT)
Dept: PULMONOLOGY | Facility: CLINIC | Age: 72
End: 2022-07-14

## 2022-07-14 VITALS
HEIGHT: 66 IN | HEART RATE: 78 BPM | TEMPERATURE: 97 F | SYSTOLIC BLOOD PRESSURE: 132 MMHG | OXYGEN SATURATION: 97 % | DIASTOLIC BLOOD PRESSURE: 70 MMHG | WEIGHT: 162 LBS | BODY MASS INDEX: 26.03 KG/M2 | RESPIRATION RATE: 16 BRPM

## 2022-07-14 PROCEDURE — 99214 OFFICE O/P EST MOD 30 MIN: CPT

## 2022-07-14 RX ORDER — BECLOMETHASONE DIPROPIONATE 80 UG/1
80 AEROSOL, METERED NASAL
Qty: 1 | Refills: 1 | Status: ACTIVE | COMMUNITY
Start: 2022-07-14 | End: 1900-01-01

## 2022-07-14 NOTE — PROCEDURE
[FreeTextEntry1] : \par Ct scan 7.10 emphysema, rml rul consolidation patchy medially; no lymph nodes\par \par chest xray showed possibly right hemidiaphragm consolidation

## 2022-07-14 NOTE — ASSESSMENT
[FreeTextEntry1] : Mr. TAYLOR is a 72 year old male with a history of  childhood illnesses, former 40+ pack year smoker none in 11 years, some chronic back issues, lumbar radiculopathy, LPR/GERD, mild COPD, colonic polyps, [facility]AS who comes into the office today for follow up pulmonary follow up for SOB( cardiac w/up negative); right sided pneumonia CAP/ sinus infection \par \par The patient's shortness of breath is multifactorial due to:\par -pulmonary disease \par      - COPD \par -poor breathing mechanics \par -out of shape\par -?cardiac disease \par \par Problem 1:COPD (some emphysema) - improved /stable\par - off Singulair 10 mg QHS\par -continue Breztri 2 inhalations BID with an AeroChamber \par -Boost Daliresp 250 mg q/day to Daliresp 500 \par -add Zyflo CR 1200 BID \par - s/p Blood test: Alpha1 Antitrypsin level (WNL)\par -COPD is a progressive disease and although it can’t be cured , appropriate management can slow its progression, reduce frequency and severity of exacerbations, and improve symptoms and the patient quality of life. Hospitalizations are the greatest contributor to the total COPD costs and account for up to 87% of total COPD related costs. Exacerbations are the main cause of admissions and subsequently account for the 40-75% of COPD costs. Inhaled maintenance therapy reduces the incidence of exacerbations in patients with stable COPD. Incorrect inhaler use and nonadherence are major obstacles to achieving COPD treatment goals. Many COPD patients have challenges (impaired inhalation, limited dexterity, reduced cognition: that limit their ability to correctly use their COPD treatment devices resulting in reduced symptom control. Of most importance is smoking cessation and early intervention with respiratory illnesses and contemplation for pulmonary rehab to enhance quality of life. \par -Inhaler technique reviewed as well as oral hygiene techniques reviewed with patient. Avoidance of cold air, extremes of temperature, rescue inhaler should be used before exercise. Order of medication reviewed with patient. Recommended use of a cool mist humidifier in the bedroom.\par \par Problem 1A: Allergy-stable \par -ENT Dr. Perlman/ [place] \par -continue Olopatadine 0.6% 1 sniff BID\par -Add Qnasl 1 sniff BID \par - Environmental measures for allergies were encouraged including mattress and pillow covers, air purifier, and environmental controls.\par \par Problem 1B: ?Iron Deficiency Anemia\par -s/p Blood work to check Iron studies and CBC - WNL elvated Hgb 17.1\par \par Problem 1C: PNA/ sinusitis \par -complete Ceftin 500x2 weeks \par -Add Zithromax 500 mg x5 days \par -Simply Saline \par \par Problem 2: LPR/ GERD \par -continue Dexilant 60mg qAM\par -continue Pepcid 40 mg QHS\par -Rule of 2s: avoid eating too much, eating too fast, eating too late, eating too spicy, eating too lousy, eating two hours before bed.\par -Things to avoid including overeating, spicy foods, tight clothing, eating within three hours of bed, this list is not all inclusive. \par -For treatment of reflux, possible options discussed including diet control, H2 blockers, PPIs, as well as coating motility agents discussed as treatment options. Timing of meals and proximity of last meal to sleep were discussed. If symptoms persist, a formal gastrointestinal evaluation is needed.\par  \par Problem 3: Lung CA screening\par - Next CT 11/2022\par Lung cancer screening is recommended for people between the ages of 50 and 80 with prior 20+ pack year smoking histories. There is irrefutable evidence for realization of lung cancer screening based on two large randomized control trials demonstrating relative reduction in lung cancer mortality for patients undergoing low-dose CT scanning. Risks and benefits reviewed with the patient.\par \par Problem 4: Poor sleep / (+) mild SYDNEY   (memory/concentration, metabolism, reflux)\par - s/p Home Sleep Study - c/w mild sleep apnea \par - unable to use oral appliance ; - Sleep Right / Oxy Aid / Chin Strap \par -recommended AVEO tsd \par Sleep apnea is associated with adverse clinical consequences which an affect most organ systems. Cardiovascular disease risk includes arrhythmias, atrial fibrillation, hypertension, coronary artery disease, and stroke. Metabolic disorders include diabetes type 2, non-alcoholic fatty liver disease. Mood disorder especially depression; and cognitive decline especially in the elderly. Associations with chronic reflux/Winkler’s esophagus some but not all inclusive. \par \par  Problem 4A: ?RLS\par -s/p : iron studies, thyroid function test, free and total testosterone level (WNL)\par - continue Mirapex .5 mg QHS \par Restless Legs Syndrome (RLS), also known as Wing-Ekbom Disease, is a common sleep -related movement disorder. About 1 in 10 adults in the [place] have problems from restless leg syndrome. It also can be seen in about 2% of children. Women are twice as likely as men to have RLS. People with RLS will have symptoms most often during times when they are less active, especially at bedtime. RLS most often causes an overwhelming urge to move your legs and sometimes other parts of your body. This urge is associated with unpleasant sensations in different parts of the body. The symptoms can be mild to severe and can affect your ability to go to sleep and stay asleep. People with RLS often sleep less at night and feel more tired during the day. \par \par Problem 5: Poor Mechanics of Breathing / Anxiety \par -recommended Wim Michaelf and Butmatthew breathing techniques \par - Proper breathing techniques were reviewed with an emphasis of exhalation. Patient instructed to breath in for 1 second and out for four seconds. Patient was encouraged to not talk while walking. \par \par Problem 6 : Out of shape\par -Weight loss, exercise, and diet control were discussed and are highly encouraged. Treatment options were given such as, aqua therapy, and contacting a nutritionist. Recommended to use the elliptical, stationary bike, less use of treadmill.  \par \par Problem 7 : ?Cardiac\par -s/p cardiac evaluation with (Dr. Mayes)\par \par Problem 8 : Health Maintenance/COVID19 Precautions:\par -s/p Pfizer COVID 19 vaccine x 4\par - Clean your hands often. Wash your hands often with soap and water for at least 20 seconds, especially after blowing your nose, coughing, or sneezing, or having been in a public place.\par - If soap and water are not available, use a hand  that contains at least 60% alcohol.\par - To the extent possible, avoid touching high-touch surfaces in public places - elevator buttons, door handles, handrails, handshaking with people, etc. Use a tissue or your sleeve to cover your hand or finger if you must touch something.\par - Wash your hands after touching surfaces in public places.\par - Avoid touching your face, nose, eyes, etc.\par - Clean and disinfect your home to remove germs: practice routine cleaning of frequently touched surfaces (for example: tables, doorknobs, light switches, handles, desks, toilets, faucets, sinks & cell phones)\par - Avoid crowds, especially in poorly ventilated spaces. Your risk of exposure to respiratory viruses like COVID-19 may increase in crowded, closed-in settings with little air circulation if there are people in the crowd who are sick. All patients are recommended to practice social distancing and stay at least 6 feet away from others.\par - Avoid all non-essential travel including plane trips, and especially avoid embarking on cruise ships.\par -If COVID-19 is spreading in your community, take extra measures to put distance between yourself and other people to further reduce your risk of being exposed to this new virus.\par -Stay home as much as possible.\par - Consider ways of getting food brought to your house through family, social, or commercial networks\par -Be aware that the virus has been known to live in the air up to 3 hours post exposure, cardboard up to 24 hours post exposure, copper up to 4 hours post exposure, steel and plastic up to 2-3 days post exposure. Risk of transmission from these surfaces are affected by many variables.\par Immune Support Recommendations:\par -OTC Vitamin C 500mg BID \par -OTC Quercetin 250-500mg BID \par -OTC Zinc 75-100mg per day \par -OTC Melatonin 1 or 2 mg a night \par -OTC Vitamin D 1-4000mg per day \par -OTC Tonic Water 8oz per day\par Asthma and COVID19:\par You need to make sure your asthma is under control. This often requires the use of inhaled corticosteroids (and sometimes oral corticosteroids). Inhaled corticosteroids do not likely reduce your immune system’s ability to fight infections, but oral corticosteroids may. It is important to use the steps above to protect yourself to limit your exposure to any respiratory virus.\par \par Problem 9 : health maintenance\par -recommended Mouth Kote Oral Spray \par -s/p influenza vaccine 2021\par -recommended strep pneumonia vaccines after age 65: Prevnar-13 vaccine, followed by Pneumo vaccine 23 one year following (completed )\par -recommended early intervention for URIs\par -recommended regular osteoporosis evaluations\par -recommended early dermatological evaluations\par -recommended after the age of 50 to the age of 70, colonoscopy every 5 years \par \par  Follow up in 6-8 weeks\par -he  is recommended to call with any changes, questions, or concerns.

## 2022-07-14 NOTE — ADDENDUM
[FreeTextEntry1] : Documented by Eran Carlson acting as a scribe for Dr. Grzegorz Corado on 07/14/2022 .\par \par All medical record entries made by the Scribe were at my, Dr. Grzegorz Corado's, direction and personally dictated by me on. I have reviewed the chart and agree that the record accurately reflects my personal performance of the history, physical exam, assessment and plan. I have also personally directed, reviewed, and agree with the discharge instructions.

## 2022-07-14 NOTE — HISTORY OF PRESENT ILLNESS
[TextBox_4] : Mr. TAYLOR is a 72 year old male presenting to the office today for a follow up pulmonary follow up. His chief complaint is\par \par -he notes taking CT because last week he wasn't feeling well \par -he notes having tests in the house multiple times negative \par -he notes not feeling himself during those times while testing\par -he notes having some hearing difficulties during the times \par -he notes getting more testing and still negative\par -he notes having had heavy chills, \par -he notes temperature [id number] degrees \par -he notes chest xray showed possibly right hemidiaphragm consolidation \par -he notes pneumonia being found on the right side \par -he notes not being v hungry \par -he notes taking ceftin \par -he notes his taste buds were off too\par -he notes getting a strong nasty odor in his nose but wont go away \par -he notes not doing anything for his nose \par -he notes breathing is choppy \par -he notes sob when he walks fast \par -he notes daliresp helps him \par -he notes when he is at home, his breathing is a lot better \par patient denies any headaches, nausea, vomiting, fever, chills, sweats, chest pain, chest pressure, palpitations, coughing, wheezing, fatigue, diarrhea, constipation, dysphagia, myalgias, dizziness, leg swelling, leg pain, itchy eyes, itchy ears, heartburn, reflux or sour taste in the mouth

## 2022-07-15 LAB
CULTURE RESULTS: SIGNIFICANT CHANGE UP
CULTURE RESULTS: SIGNIFICANT CHANGE UP
SPECIMEN SOURCE: SIGNIFICANT CHANGE UP
SPECIMEN SOURCE: SIGNIFICANT CHANGE UP

## 2022-08-18 ENCOUNTER — RX RENEWAL (OUTPATIENT)
Age: 72
End: 2022-08-18

## 2022-08-29 ENCOUNTER — APPOINTMENT (OUTPATIENT)
Dept: PULMONOLOGY | Facility: CLINIC | Age: 72
End: 2022-08-29

## 2022-08-29 VITALS
TEMPERATURE: 97 F | BODY MASS INDEX: 24.91 KG/M2 | SYSTOLIC BLOOD PRESSURE: 130 MMHG | HEART RATE: 55 BPM | HEIGHT: 66 IN | OXYGEN SATURATION: 98 % | RESPIRATION RATE: 17 BRPM | DIASTOLIC BLOOD PRESSURE: 70 MMHG | WEIGHT: 155 LBS

## 2022-08-29 DIAGNOSIS — Z71.89 OTHER SPECIFIED COUNSELING: ICD-10-CM

## 2022-08-29 PROCEDURE — 99214 OFFICE O/P EST MOD 30 MIN: CPT | Mod: 25

## 2022-08-29 PROCEDURE — 94010 BREATHING CAPACITY TEST: CPT

## 2022-08-29 PROCEDURE — 95012 NITRIC OXIDE EXP GAS DETER: CPT

## 2022-08-29 PROCEDURE — 94727 GAS DIL/WSHOT DETER LNG VOL: CPT

## 2022-08-29 PROCEDURE — 94729 DIFFUSING CAPACITY: CPT

## 2022-08-29 RX ORDER — CEFUROXIME AXETIL 500 MG/1
500 TABLET ORAL
Qty: 14 | Refills: 0 | Status: DISCONTINUED | COMMUNITY
Start: 2022-07-14 | End: 2022-08-29

## 2022-08-29 RX ORDER — AZITHROMYCIN 250 MG/1
250 TABLET, FILM COATED ORAL
Qty: 6 | Refills: 0 | Status: DISCONTINUED | COMMUNITY
Start: 2022-07-10 | End: 2022-08-29

## 2022-08-29 RX ORDER — CEFUROXIME AXETIL 500 MG/1
500 TABLET ORAL
Qty: 14 | Refills: 0 | Status: DISCONTINUED | COMMUNITY
Start: 2022-07-09 | End: 2022-08-29

## 2022-08-29 NOTE — ASSESSMENT
[FreeTextEntry1] : Mr. TAYLOR is a 72 year old male with a history of  childhood illnesses, former 40+ pack year smoker none in 11 years, some chronic back issues, lumbar radiculopathy, LPR/GERD, mild COPD, colonic polyps, [facility]AS who comes into the office today for follow up pulmonary follow up for SOB( cardiac w/up negative); right sided pneumonia CAP/ sinus infection- resolved (s/p Covid 19 7/2022\par \par The patient's shortness of breath is multifactorial due to:\par -pulmonary disease \par      - COPD \par -poor breathing mechanics \par -out of shape\par -?cardiac disease \par \par Problem 1:COPD (some emphysema) - improved /stable\par - off Singulair 10 mg QHS\par -continue Breztri 2 inhalations BID with an AeroChamber \par -continue Daliresp 500 \par -continue Zyflo CR 1200 BID \par - s/p Blood test: Alpha1 Antitrypsin level (WNL)\par -COPD is a progressive disease and although it can’t be cured , appropriate management can slow its progression, reduce frequency and severity of exacerbations, and improve symptoms and the patient quality of life. Hospitalizations are the greatest contributor to the total COPD costs and account for up to 87% of total COPD related costs. Exacerbations are the main cause of admissions and subsequently account for the 40-75% of COPD costs. Inhaled maintenance therapy reduces the incidence of exacerbations in patients with stable COPD. Incorrect inhaler use and nonadherence are major obstacles to achieving COPD treatment goals. Many COPD patients have challenges (impaired inhalation, limited dexterity, reduced cognition: that limit their ability to correctly use their COPD treatment devices resulting in reduced symptom control. Of most importance is smoking cessation and early intervention with respiratory illnesses and contemplation for pulmonary rehab to enhance quality of life. \par -Inhaler technique reviewed as well as oral hygiene techniques reviewed with patient. Avoidance of cold air, extremes of temperature, rescue inhaler should be used before exercise. Order of medication reviewed with patient. Recommended use of a cool mist humidifier in the bedroom.\par \par Problem 1A: Allergy-stable \par -ENT Dr. Perlman/ [place] \par -continue Olopatadine 0.6% 1 sniff BID\par -continue Qnasl 1 sniff BID \par - Environmental measures for allergies were encouraged including mattress and pillow covers, air purifier, and environmental controls.\par \par Problem 1B: ?Iron Deficiency Anemia\par -s/p Blood work to check Iron studies and CBC - WNL elvated Hgb 17.1\par \par Problem 2: LPR/ GERD \par -continue Dexilant 60mg qAM\par -continue Pepcid 40 mg QHS\par -Rule of 2s: avoid eating too much, eating too fast, eating too late, eating too spicy, eating too lousy, eating two hours before bed.\par -Things to avoid including overeating, spicy foods, tight clothing, eating within three hours of bed, this list is not all inclusive. \par -For treatment of reflux, possible options discussed including diet control, H2 blockers, PPIs, as well as coating motility agents discussed as treatment options. Timing of meals and proximity of last meal to sleep were discussed. If symptoms persist, a formal gastrointestinal evaluation is needed.\par  \par Problem 3: Lung CA screening\par - Next CT 11/2022\par Lung cancer screening is recommended for people between the ages of 50 and 80 with prior 20+ pack year smoking histories. There is irrefutable evidence for realization of lung cancer screening based on two large randomized control trials demonstrating relative reduction in lung cancer mortality for patients undergoing low-dose CT scanning. Risks and benefits reviewed with the patient.\par \par Problem 4: Poor sleep / (+) mild SYDNEY   (memory/concentration, metabolism, reflux)\par - s/p Home Sleep Study - c/w mild sleep apnea \par - unable to use oral appliance ; - Sleep Right / Oxy Aid / Chin Strap \par -recommended AVEO tsd \par Sleep apnea is associated with adverse clinical consequences which an affect most organ systems. Cardiovascular disease risk includes arrhythmias, atrial fibrillation, hypertension, coronary artery disease, and stroke. Metabolic disorders include diabetes type 2, non-alcoholic fatty liver disease. Mood disorder especially depression; and cognitive decline especially in the elderly. Associations with chronic reflux/Winkler’s esophagus some but not all inclusive. \par \par  Problem 4A: ?RLS\par -s/p : iron studies, thyroid function test, free and total testosterone level (WNL)\par - continue Mirapex .5 mg QHS \par Restless Legs Syndrome (RLS), also known as Wing-Ekbom Disease, is a common sleep -related movement disorder. About 1 in 10 adults in the [place] have problems from restless leg syndrome. It also can be seen in about 2% of children. Women are twice as likely as men to have RLS. People with RLS will have symptoms most often during times when they are less active, especially at bedtime. RLS most often causes an overwhelming urge to move your legs and sometimes other parts of your body. This urge is associated with unpleasant sensations in different parts of the body. The symptoms can be mild to severe and can affect your ability to go to sleep and stay asleep. People with RLS often sleep less at night and feel more tired during the day. \par \par Problem 5: Poor Mechanics of Breathing / Anxiety \par -recommended Wim Hof and B[place]yko breathing techniques \par - Proper breathing techniques were reviewed with an emphasis of exhalation. Patient instructed to breath in for 1 second and out for four seconds. Patient was encouraged to not talk while walking. \par \par Problem 6 : Out of shape\par -Weight loss, exercise, and diet control were discussed and are highly encouraged. Treatment options were given such as, aqua therapy, and contacting a nutritionist. Recommended to use the elliptical, stationary bike, less use of treadmill.  \par \par Problem 7 : ?Cardiac\par -s/p cardiac evaluation with (Dr. Esposito)\par \par Problem 8 : Health Maintenance/COVID19 Precautions:\par -s/p Covid 19 7/2022\par -s/p Pfizer COVID 19 vaccine x 4\par - Clean your hands often. Wash your hands often with soap and water for at least 20 seconds, especially after blowing your nose, coughing, or sneezing, or having been in a public place.\par - If soap and water are not available, use a hand  that contains at least 60% alcohol.\par - To the extent possible, avoid touching high-touch surfaces in public places - elevator buttons, door handles, handrails, handshaking with people, etc. Use a tissue or your sleeve to cover your hand or finger if you must touch something.\par - Wash your hands after touching surfaces in public places.\par - Avoid touching your face, nose, eyes, etc.\par - Clean and disinfect your home to remove germs: practice routine cleaning of frequently touched surfaces (for example: tables, doorknobs, light switches, handles, desks, toilets, faucets, sinks & cell phones)\par - Avoid crowds, especially in poorly ventilated spaces. Your risk of exposure to respiratory viruses like COVID-19 may increase in crowded, closed-in settings with little air circulation if there are people in the crowd who are sick. All patients are recommended to practice social distancing and stay at least 6 feet away from others.\par - Avoid all non-essential travel including plane trips, and especially avoid embarking on cruise ships.\par -If COVID-19 is spreading in your community, take extra measures to put distance between yourself and other people to further reduce your risk of being exposed to this new virus.\par -Stay home as much as possible.\par - Consider ways of getting food brought to your house through family, social, or commercial networks\par -Be aware that the virus has been known to live in the air up to 3 hours post exposure, cardboard up to 24 hours post exposure, copper up to 4 hours post exposure, steel and plastic up to 2-3 days post exposure. Risk of transmission from these surfaces are affected by many variables.\par Immune Support Recommendations:\par -OTC Vitamin C 500mg BID \par -OTC Quercetin 250-500mg BID \par -OTC Zinc 75-100mg per day \par -OTC Melatonin 1 or 2 mg a night \par -OTC Vitamin D 1-4000mg per day \par -OTC Tonic Water 8oz per day\par Asthma and COVID19:\par You need to make sure your asthma is under control. This often requires the use of inhaled corticosteroids (and sometimes oral corticosteroids). Inhaled corticosteroids do not likely reduce your immune system’s ability to fight infections, but oral corticosteroids may. It is important to use the steps above to protect yourself to limit your exposure to any respiratory virus.\par \par Problem 9 : health maintenance\par -recommended Mouth Kote Oral Spray \par -s/p influenza vaccine 2021\par -recommended strep pneumonia vaccines after age 65: Prevnar-13 vaccine, followed by Pneumo vaccine 23 one year following (completed )\par -recommended early intervention for URIs\par -recommended regular osteoporosis evaluations\par -recommended early dermatological evaluations\par -recommended after the age of 50 to the age of 70, colonoscopy every 5 years \par \par  Follow up in 6-8 weeks\par -he  is recommended to call with any changes, questions, or concerns.

## 2022-08-29 NOTE — HISTORY OF PRESENT ILLNESS
[TextBox_4] : Mr. TAYLOR is a 72 year old male presenting to the office today for a follow up pulmonary follow up. His chief complaint is\par \par -he notes s/p Covid 19 7/2022\par -he notes experiencing rebound post course of Paxlovid but now fully resolved \par -he notes energy level is stable at 8/10 \par -he notes walking with pace to work for exercise\par -he notes ERICKSON while walking with pace \par -he notes knee pain at the end of the day \par -he notes bowels are regular \par -he notes intermittent odor that causes nausea \par -he denies use of Zinc \par -s/p covid 19 vaccine x 4 \par -he notes hearing loss in one ear \par \par -He denies any visual issues, headaches, vomiting, fever, chills, sweats, chest pains, chest pressure, diarrhea, constipation, dysphagia, myalgia, dizziness, leg swelling, itchy eyes, itchy ears, heartburn, reflux, or sour taste in the mouth.

## 2022-08-29 NOTE — ADDENDUM
[FreeTextEntry1] : Documented by Brittaney Trejo acting as a scribe for Dr. Grzegorz Corado on 08/29/2022 \par \par All medical record entries made by the Scribe were at my, Dr. Grzegorz Corado's, direction and personally dictated by me on 08/29/2022 . I have reviewed the chart and agree that the record accurately reflects my personal performance of the history, physical exam, assessment and plan. I have also personally directed, reviewed, and agree with the discharge instructions

## 2022-08-29 NOTE — PHYSICAL EXAM
[No Acute Distress] : no acute distress [Normal Oropharynx] : normal oropharynx [Normal Appearance] : normal appearance [No Neck Mass] : no neck mass [Normal Rate/Rhythm] : normal rate/rhythm [Normal S1, S2] : normal s1, s2 [No Murmurs] : no murmurs [No Resp Distress] : no resp distress [Clear to Auscultation Bilaterally] : clear to auscultation bilaterally [No Abnormalities] : no abnormalities [Benign] : benign [Normal Gait] : normal gait [No Clubbing] : no clubbing [No Cyanosis] : no cyanosis [No Edema] : no edema [FROM] : FROM [Normal Color/ Pigmentation] : normal color/ pigmentation [No Focal Deficits] : no focal deficits [Oriented x3] : oriented x3 [Normal Affect] : normal affect [II] : Mallampati Class: II [TextBox_2] : lipoma left back  [TextBox_68] : I:E ratio 1:3; clear

## 2022-08-29 NOTE — PROCEDURE
[FreeTextEntry1] : FULL PFTs reveals normal flows; FEV1 was 2.54 L which is 97% of predicted; normal lung volumes; normal diffusion of 21.6, which is 116% of predicted; normal flow volume loop \par \par FENO was 32; normal value being less than 25\par Fractional exhaled nitric oxide (FENO) is regarded as a simple, noninvasive method for assessing eosinophilic airway inflammation. Produced by a variety of cells within the lung, nitric oxide (NO) concentrations are generally low in healthy individuals. However, high concentrations of NO appear to be involved in nonspecific host defense mechanisms and chronic inflammatory diseases such as asthma. The American Thoracic Society (ATS) therefore has recommended using FENO to aid in the diagnosis and monitoring of eosinophilic airway inflammation and asthma, and for identifying steroid responsive individuals whose chronic respiratory symptoms may be airway inflammation.

## 2022-12-09 NOTE — PROCEDURE
Patient notified.   She will call back to schedule the clinical visit.    [FreeTextEntry1] : CXR reveals a normal sized heart; no evidence of infiltrate or effusion- - scoliosis to the heart \par \par \par PFT s (Jan,2020) : showed normal flows with a FE1 2.58: 96%, normal lung volumes, normal diffusion of 16.8: 87% which was corrected to 2.59: 68% volume. Nml flow volume loop.\par \par CT (JAN.13.2020): impression: Emphysema. j43.9 \par \par 6 minute walk test reveals a low saturation of 97% with no evidence of dyspnea or fatigue; walked 521.6 meters \par

## 2022-12-21 ENCOUNTER — RX RENEWAL (OUTPATIENT)
Age: 72
End: 2022-12-21

## 2023-01-03 ENCOUNTER — NON-APPOINTMENT (OUTPATIENT)
Age: 73
End: 2023-01-03

## 2023-01-03 ENCOUNTER — APPOINTMENT (OUTPATIENT)
Dept: PULMONOLOGY | Facility: CLINIC | Age: 73
End: 2023-01-03
Payer: MEDICARE

## 2023-01-03 VITALS
TEMPERATURE: 97 F | SYSTOLIC BLOOD PRESSURE: 110 MMHG | RESPIRATION RATE: 17 BRPM | HEIGHT: 66 IN | DIASTOLIC BLOOD PRESSURE: 80 MMHG | OXYGEN SATURATION: 98 % | HEART RATE: 65 BPM | BODY MASS INDEX: 26.03 KG/M2 | WEIGHT: 162 LBS

## 2023-01-03 DIAGNOSIS — Z87.01 PERSONAL HISTORY OF PNEUMONIA (RECURRENT): ICD-10-CM

## 2023-01-03 PROCEDURE — 94060 EVALUATION OF WHEEZING: CPT

## 2023-01-03 PROCEDURE — 99214 OFFICE O/P EST MOD 30 MIN: CPT | Mod: 25

## 2023-01-03 NOTE — ADDENDUM
[FreeTextEntry1] : Documented by Nghia Tong acting as a scribe for Dr. Grzegorz Corado on 01/03/2023.\par \par All medical record entries made by the Scribe were at my, Dr. Grzegorz Corado's, direction and personally dictated by me on 01/03/2023. I have reviewed the chart and agree that the record accurately reflects my personal performance of the history, physical exam, assessment and plan. I have also personally directed, reviewed, and agree with the discharge instructions.

## 2023-01-03 NOTE — ASSESSMENT
[FreeTextEntry1] : Mr. TAYLOR is a 72 year old male with a history of  childhood illnesses, former 40+ pack year smoker none in 11 years, some chronic back issues, lumbar radiculopathy, LPR/GERD, mild COPD, colonic polyps, OSAS who comes into the office today for follow up pulmonary follow up for SOB( cardiac w/up negative); right sided pneumonia CAP/ sinus infection- resolved (s/p Covid 19 7/2022) - remains poor sleep/dyspnea\par \par The patient's shortness of breath is multifactorial due to:\par -pulmonary disease \par      - COPD \par -poor breathing mechanics \par -out of shape\par -?cardiac disease \par \par Problem 1:COPD (some emphysema) - improved /stable\par - off Singulair 10 mg QHS\par -continue Breztri 2 inhalations BID with an AeroChamber \par -Add Xopenex 0.63 Q6H via nebulizer or MDI\par -continue Daliresp 500 \par -continue Zyflo CR 1200 BID \par - s/p Blood test: Alpha1 Antitrypsin level (WNL)\par -COPD is a progressive disease and although it can’t be cured , appropriate management can slow its progression, reduce frequency and severity of exacerbations, and improve symptoms and the patient quality of life. Hospitalizations are the greatest contributor to the total COPD costs and account for up to 87% of total COPD related costs. Exacerbations are the main cause of admissions and subsequently account for the 40-75% of COPD costs. Inhaled maintenance therapy reduces the incidence of exacerbations in patients with stable COPD. Incorrect inhaler use and nonadherence are major obstacles to achieving COPD treatment goals. Many COPD patients have challenges (impaired inhalation, limited dexterity, reduced cognition: that limit their ability to correctly use their COPD treatment devices resulting in reduced symptom control. Of most importance is smoking cessation and early intervention with respiratory illnesses and contemplation for pulmonary rehab to enhance quality of life. \par -Inhaler technique reviewed as well as oral hygiene techniques reviewed with patient. Avoidance of cold air, extremes of temperature, rescue inhaler should be used before exercise. Order of medication reviewed with patient. Recommended use of a cool mist humidifier in the bedroom.\par \par Problem 1A: Allergy-stable \par -ENT Dr. Perlman/ [facility] \par -continue Olopatadine 0.6% 1 sniff BID\par -continue Qnasl 1 sniff BID \par - Environmental measures for allergies were encouraged including mattress and pillow covers, air purifier, and environmental controls.\par \par Problem 1B: ?Iron Deficiency Anemia\par -s/p Blood work to check Iron studies and CBC - WNL elvated Hgb 17.1\par \par Problem 2: LPR/ GERD \par -continue Dexilant 60mg qAM\par -continue Pepcid 40 mg QHS\par -Rule of 2s: avoid eating too much, eating too fast, eating too late, eating too spicy, eating too lousy, eating two hours before bed.\par -Things to avoid including overeating, spicy foods, tight clothing, eating within three hours of bed, this list is not all inclusive. \par -For treatment of reflux, possible options discussed including diet control, H2 blockers, PPIs, as well as coating motility agents discussed as treatment options. Timing of meals and proximity of last meal to sleep were discussed. If symptoms persist, a formal gastrointestinal evaluation is needed.\par  \par Problem 3: Lung CA screening (pending)\par - Next CT 11/2022\par Lung cancer screening is recommended for people between the ages of 50 and 80 with prior 20+ pack year smoking histories. There is irrefutable evidence for realization of lung cancer screening based on two large randomized control trials demonstrating relative reduction in lung cancer mortality for patients undergoing low-dose CT scanning. Risks and benefits reviewed with the patient.\par \par Problem 4: Poor sleep / (+) mild SYDNEY   (memory/concentration, metabolism, reflux)\par - s/p Home Sleep Study - c/w mild sleep apnea \par - unable to use oral appliance ; - Sleep Right / Oxy Aid / Chin Strap / Excite / [place] Pro\par -recommended AVEO tsd \par Sleep apnea is associated with adverse clinical consequences which an affect most organ systems. Cardiovascular disease risk includes arrhythmias, atrial fibrillation, hypertension, coronary artery disease, and stroke. Metabolic disorders include diabetes type 2, non-alcoholic fatty liver disease. Mood disorder especially depression; and cognitive decline especially in the elderly. Associations with chronic reflux/Winkler’s esophagus some but not all inclusive. \par \par  Problem 4A: ?RLS\par -s/p : iron studies, thyroid function test, free and total testosterone level (WNL)\par - continue Mirapex .5 mg QHS \par Restless Legs Syndrome (RLS), also known as Wing-Ekbom Disease, is a common sleep -related movement disorder. About 1 in 10 adults in the [place] have problems from restless leg syndrome. It also can be seen in about 2% of children. Women are twice as likely as men to have RLS. People with RLS will have symptoms most often during times when they are less active, especially at bedtime. RLS most often causes an overwhelming urge to move your legs and sometimes other parts of your body. This urge is associated with unpleasant sensations in different parts of the body. The symptoms can be mild to severe and can affect your ability to go to sleep and stay asleep. People with RLS often sleep less at night and feel more tired during the day. \par \par Problem 5: Poor Mechanics of Breathing / Anxiety \par -recommended Wipaul Rodriguezf and Butmatthew breathing techniques \par - Proper breathing techniques were reviewed with an emphasis of exhalation. Patient instructed to breath in for 1 second and out for four seconds. Patient was encouraged to not talk while walking. \par \par Problem 6 : Out of shape\par -Weight loss, exercise, and diet control were discussed and are highly encouraged. Treatment options were given such as, aqua therapy, and contacting a nutritionist. Recommended to use the elliptical, stationary bike, less use of treadmill.  \par \par Problem 7 : ?Cardiac\par -s/p cardiac evaluation with (Dr. Esposito)\par \par Problem 8 : Health Maintenance/COVID19 Precautions:\par -s/p Covid 19 7/2022\par -s/p Pfizer COVID 19 vaccine x 5\par - Clean your hands often. Wash your hands often with soap and water for at least 20 seconds, especially after blowing your nose, coughing, or sneezing, or having been in a public place.\par - If soap and water are not available, use a hand  that contains at least 60% alcohol.\par - To the extent possible, avoid touching high-touch surfaces in public places - elevator buttons, door handles, handrails, handshaking with people, etc. Use a tissue or your sleeve to cover your hand or finger if you must touch something.\par - Wash your hands after touching surfaces in public places.\par - Avoid touching your face, nose, eyes, etc.\par - Clean and disinfect your home to remove germs: practice routine cleaning of frequently touched surfaces (for example: tables, doorknobs, light switches, handles, desks, toilets, faucets, sinks & cell phones)\par - Avoid crowds, especially in poorly ventilated spaces. Your risk of exposure to respiratory viruses like COVID-19 may increase in crowded, closed-in settings with little air circulation if there are people in the crowd who are sick. All patients are recommended to practice social distancing and stay at least 6 feet away from others.\par - Avoid all non-essential travel including plane trips, and especially avoid embarking on cruise ships.\par -If COVID-19 is spreading in your community, take extra measures to put distance between yourself and other people to further reduce your risk of being exposed to this new virus.\par -Stay home as much as possible.\par - Consider ways of getting food brought to your house through family, social, or commercial networks\par -Be aware that the virus has been known to live in the air up to 3 hours post exposure, cardboard up to 24 hours post exposure, copper up to 4 hours post exposure, steel and plastic up to 2-3 days post exposure. Risk of transmission from these surfaces are affected by many variables.\par Immune Support Recommendations:\par -OTC Vitamin C 500mg BID \par -OTC Quercetin 250-500mg BID \par -OTC Zinc 75-100mg per day \par -OTC Melatonin 1 or 2 mg a night \par -OTC Vitamin D 1-4000mg per day \par -OTC Tonic Water 8oz per day\par Asthma and COVID19:\par You need to make sure your asthma is under control. This often requires the use of inhaled corticosteroids (and sometimes oral corticosteroids). Inhaled corticosteroids do not likely reduce your immune system’s ability to fight infections, but oral corticosteroids may. It is important to use the steps above to protect yourself to limit your exposure to any respiratory virus.\par \par Problem 9 : health maintenance\par -recommended Mouth Kote Oral Spray \par -s/p influenza vaccine 2022\par -recommended Sanotize anti viral nasal spray in case of viral infection \par -recommended strep pneumonia vaccines after age 65: Prevnar-13 vaccine, followed by Pneumo vaccine 23 one year following (completed )\par -recommended early intervention for URIs\par -recommended regular osteoporosis evaluations\par -recommended early dermatological evaluations\par -recommended after the age of 50 to the age of 70, colonoscopy every 5 years \par \par  Follow up in 6-8 weeks\par -he  is recommended to call with any changes, questions, or concerns.

## 2023-01-03 NOTE — HISTORY OF PRESENT ILLNESS
[TextBox_4] : Mr. TAYLOR is a 72 year old male presenting to the office today for a follow up pulmonary follow up. His chief complaint is\par \par -he notes insurance pushback on CAT scan (pending next week)\par -he notes taking Dexilant\par -he notes Daliresp and Breztri are very expensive due to insurance issues\par -he notes he has been taking Prilosec\par -he notes his energy levels are poor\par -he notes being fatigued all the time\par -he notes his breathing is not great\par -he notes exercising (walking)\par -he notes chest pressure at time\par -he notes that he is not sleeping well\par -he notes his sleep is interrupted \par -he notes reflux when he is not on Prilosec\par -s/p flu\par \par \par \par -patient denies any headaches, nausea, vomiting, fever, chills, sweats, chest pain,  palpitations, coughing, wheezing, diarrhea, constipation, dysphagia, myalgias, dizziness, leg swelling, leg pain, itchy eyes, itchy ears

## 2023-01-03 NOTE — PHYSICAL EXAM
[Normal Oropharynx] : normal oropharynx [No Acute Distress] : no acute distress [II] : Mallampati Class: II [Normal Appearance] : normal appearance [No Neck Mass] : no neck mass [Normal Rate/Rhythm] : normal rate/rhythm [Normal S1, S2] : normal s1, s2 [No Murmurs] : no murmurs [No Resp Distress] : no resp distress [Clear to Auscultation Bilaterally] : clear to auscultation bilaterally [No Abnormalities] : no abnormalities [Benign] : benign [Normal Gait] : normal gait [No Clubbing] : no clubbing [No Cyanosis] : no cyanosis [No Edema] : no edema [FROM] : FROM [Normal Color/ Pigmentation] : normal color/ pigmentation [No Focal Deficits] : no focal deficits [Oriented x3] : oriented x3 [Normal Affect] : normal affect [TextBox_68] : I:E ratio 1:3; clear

## 2023-01-13 RX ORDER — ROFLUMILAST 500 UG/1
500 TABLET ORAL
Qty: 30 | Refills: 3 | Status: ACTIVE | COMMUNITY
Start: 2021-06-10 | End: 1900-01-01

## 2023-01-14 RX ORDER — LEVALBUTEROL HYDROCHLORIDE 0.63 MG/3ML
0.63 SOLUTION RESPIRATORY (INHALATION)
Qty: 120 | Refills: 3 | Status: ACTIVE | COMMUNITY
Start: 2023-01-03 | End: 1900-01-01

## 2023-01-24 ENCOUNTER — NON-APPOINTMENT (OUTPATIENT)
Age: 73
End: 2023-01-24

## 2023-01-24 RX ORDER — CICLESONIDE 160 UG/1
160 AEROSOL, METERED RESPIRATORY (INHALATION) TWICE DAILY
Qty: 1 | Refills: 2 | Status: ACTIVE | COMMUNITY
Start: 2023-01-24 | End: 1900-01-01

## 2023-01-24 RX ORDER — GLYCOPYRROLATE AND FORMOTEROL FUMARATE 9; 4.8 UG/1; UG/1
9-4.8 AEROSOL, METERED RESPIRATORY (INHALATION)
Qty: 1 | Refills: 2 | Status: ACTIVE | COMMUNITY
Start: 2023-01-24 | End: 1900-01-01

## 2023-03-25 ENCOUNTER — RX RENEWAL (OUTPATIENT)
Age: 73
End: 2023-03-25

## 2023-05-18 ENCOUNTER — APPOINTMENT (OUTPATIENT)
Dept: PULMONOLOGY | Facility: CLINIC | Age: 73
End: 2023-05-18
Payer: MEDICARE

## 2023-05-18 ENCOUNTER — NON-APPOINTMENT (OUTPATIENT)
Age: 73
End: 2023-05-18

## 2023-05-18 VITALS
RESPIRATION RATE: 16 BRPM | OXYGEN SATURATION: 98 % | HEART RATE: 77 BPM | DIASTOLIC BLOOD PRESSURE: 60 MMHG | SYSTOLIC BLOOD PRESSURE: 128 MMHG | BODY MASS INDEX: 25.39 KG/M2 | HEIGHT: 66 IN | WEIGHT: 158 LBS | TEMPERATURE: 98 F

## 2023-05-18 PROCEDURE — 99214 OFFICE O/P EST MOD 30 MIN: CPT | Mod: 25

## 2023-05-18 PROCEDURE — 94010 BREATHING CAPACITY TEST: CPT

## 2023-05-18 NOTE — ASSESSMENT
[FreeTextEntry1] : Mr. TAYLOR is a 73 year old male with a history of  childhood illnesses, former 40+ pack year smoker none in 11 years, some chronic back issues, lumbar radiculopathy, LPR/GERD, mild COPD, colonic polyps, OSAS who comes into the office today for follow up pulmonary follow up for SOB( cardiac w/up negative); right sided pneumonia CAP/ sinus infection- resolved (s/p Covid 19 7/2022) - dyspnea (controlled)\par \par The patient's shortness of breath is multifactorial due to:\par -pulmonary disease \par      - COPD \par -poor breathing mechanics \par -out of shape\par -?cardiac disease \par \par Problem 1:COPD (some emphysema) - improved /stable\par - off Singulair 10 mg QHS\par -continue Breztri 2 inhalations BID with an AeroChamber \par -Add Xopenex 0.63 Q6H via nebulizer or MDI\par -continue Daliresp 500 \par -continue Zyflo CR 1200 BID \par - s/p Blood test: Alpha1 Antitrypsin level (WNL)\par -COPD is a progressive disease and although it can’t be cured , appropriate management can slow its progression, reduce frequency and severity of exacerbations, and improve symptoms and the patient quality of life. Hospitalizations are the greatest contributor to the total COPD costs and account for up to 87% of total COPD related costs. Exacerbations are the main cause of admissions and subsequently account for the 40-75% of COPD costs. Inhaled maintenance therapy reduces the incidence of exacerbations in patients with stable COPD. Incorrect inhaler use and nonadherence are major obstacles to achieving COPD treatment goals. Many COPD patients have challenges (impaired inhalation, limited dexterity, reduced cognition: that limit their ability to correctly use their COPD treatment devices resulting in reduced symptom control. Of most importance is smoking cessation and early intervention with respiratory illnesses and contemplation for pulmonary rehab to enhance quality of life. \par -Inhaler technique reviewed as well as oral hygiene techniques reviewed with patient. Avoidance of cold air, extremes of temperature, rescue inhaler should be used before exercise. Order of medication reviewed with patient. Recommended use of a cool mist humidifier in the bedroom.\par \par Problem 1A: Allergy-stable \par -ENT Dr. Perlman/ [place] \par -continue Olopatadine 0.6% 1 sniff BID\par -continue Qnasl 1 sniff BID \par - Environmental measures for allergies were encouraged including mattress and pillow covers, air purifier, and environmental controls.\par \par Problem 1B: ?Iron Deficiency Anemia\par -s/p Blood work to check Iron studies and CBC - WNL elvated Hgb 17.1\par \par Problem 2: LPR/ GERD \par -continue Dexilant 60mg qAM\par -continue Pepcid 40 mg QHS\par -Rule of 2s: avoid eating too much, eating too fast, eating too late, eating too spicy, eating too lousy, eating two hours before bed.\par -Things to avoid including overeating, spicy foods, tight clothing, eating within three hours of bed, this list is not all inclusive. \par -For treatment of reflux, possible options discussed including diet control, H2 blockers, PPIs, as well as coating motility agents discussed as treatment options. Timing of meals and proximity of last meal to sleep were discussed. If symptoms persist, a formal gastrointestinal evaluation is needed.\par  \par Problem 3: Lung CA screening (pending)\par - Next CT 11/2024\par Lung cancer screening is recommended for people between the ages of 50 and 80 with prior 20+ pack year smoking histories. There is irrefutable evidence for realization of lung cancer screening based on two large randomized control trials demonstrating relative reduction in lung cancer mortality for patients undergoing low-dose CT scanning. Risks and benefits reviewed with the patient.\par \par Problem 4: Poor sleep / (+) mild SYDNEY   (memory/concentration, metabolism, reflux) - improved\par - s/p Home Sleep Study - c/w mild sleep apnea \par - unable to use oral appliance ; - Sleep Right / Oxy Aid / Chin Strap / Excite / [place] Pro\par -recommended AVEO tsd \par Sleep apnea is associated with adverse clinical consequences which an affect most organ systems. Cardiovascular disease risk includes arrhythmias, atrial fibrillation, hypertension, coronary artery disease, and stroke. Metabolic disorders include diabetes type 2, non-alcoholic fatty liver disease. Mood disorder especially depression; and cognitive decline especially in the elderly. Associations with chronic reflux/Winkler’s esophagus some but not all inclusive. \par \par  Problem 4A: ?RLS\par -s/p : iron studies, thyroid function test, free and total testosterone level (WNL)\par - continue Mirapex .5 mg QHS \par Restless Legs Syndrome (RLS), also known as Wing-Ekbom Disease, is a common sleep -related movement disorder. About 1 in 10 adults in the [place] have problems from restless leg syndrome. It also can be seen in about 2% of children. Women are twice as likely as men to have RLS. People with RLS will have symptoms most often during times when they are less active, especially at bedtime. RLS most often causes an overwhelming urge to move your legs and sometimes other parts of your body. This urge is associated with unpleasant sensations in different parts of the body. The symptoms can be mild to severe and can affect your ability to go to sleep and stay asleep. People with RLS often sleep less at night and feel more tired during the day. \par \par Problem 5: Poor Mechanics of Breathing / Anxiety \par -recommended Wim Michaelf and Butmatthew breathing techniques \par - Proper breathing techniques were reviewed with an emphasis of exhalation. Patient instructed to breath in for 1 second and out for four seconds. Patient was encouraged to not talk while walking. \par \par Problem 6 : Out of shape\par -Weight loss, exercise, and diet control were discussed and are highly encouraged. Treatment options were given such as, aqua therapy, and contacting a nutritionist. Recommended to use the elliptical, stationary bike, less use of treadmill.  \par \par Problem 7 : ?Cardiac\par -s/p cardiac evaluation with (Dr. Duran)\par \par Problem 8 : Health Maintenance/COVID19 Precautions:\par -s/p Covid 19 7/2022\par -s/p Pfizer COVID 19 vaccine x 5\par Immune Support Recommendations:\par -OTC Vitamin C 500mg BID \par -OTC Quercetin 250-500mg BID \par -OTC Zinc 75-100mg per day \par -OTC Melatonin 1 or 2 mg a night \par -OTC Vitamin D 1-4000mg per day \par -OTC Tonic Water 8oz per day\par Asthma and COVID19:\par You need to make sure your asthma is under control. This often requires the use of inhaled corticosteroids (and sometimes oral corticosteroids). Inhaled corticosteroids do not likely reduce your immune system’s ability to fight infections, but oral corticosteroids may. It is important to use the steps above to protect yourself to limit your exposure to any respiratory virus.\par \par Problem 9 : health maintenance\par -recommended Shingrix vaccine \par -recommended Mouth Kote Oral Spray \par -s/p influenza vaccine 2022\par -recommended Sanotize anti viral nasal spray in case of viral infection \par -recommended strep pneumonia vaccines after age 65: Prevnar-13 vaccine, followed by Pneumo vaccine 23 one year following (completed )\par -recommended early intervention for URIs\par -recommended regular osteoporosis evaluations\par -recommended early dermatological evaluations\par -recommended after the age of 50 to the age of 70, colonoscopy every 5 years \par \par  Follow up in 6-8 weeks\par -he  is recommended to call with any changes, questions, or concerns.

## 2023-05-18 NOTE — PHYSICAL EXAM
[Normal Oropharynx] : normal oropharynx [No Acute Distress] : no acute distress [II] : Mallampati Class: II [Normal Appearance] : normal appearance [No Neck Mass] : no neck mass [Normal Rate/Rhythm] : normal rate/rhythm [Normal S1, S2] : normal s1, s2 [No Resp Distress] : no resp distress [No Murmurs] : no murmurs [Clear to Auscultation Bilaterally] : clear to auscultation bilaterally [No Abnormalities] : no abnormalities [Benign] : benign [Normal Gait] : normal gait [No Clubbing] : no clubbing [No Edema] : no edema [No Cyanosis] : no cyanosis [FROM] : FROM [No Focal Deficits] : no focal deficits [Normal Color/ Pigmentation] : normal color/ pigmentation [Oriented x3] : oriented x3 [Normal Affect] : normal affect [TextBox_11] : dentures [TextBox_68] : I:E ratio 1:3; clear

## 2023-05-18 NOTE — ADDENDUM
[FreeTextEntry1] : Documented by Nghia Tong acting as a scribe for Dr. Grzegorz Corado on 05/18/2023.\par \par All medical record entries made by the Scribe were at my, Dr. Grzegorz Corado's, direction and personally dictated by me on 05/18/2023. I have reviewed the chart and agree that the record accurately reflects my personal performance of the history, physical exam, assessment and plan. I have also personally directed, reviewed, and agree with the discharge instructions.

## 2023-05-18 NOTE — PROCEDURE
[FreeTextEntry1] : Feno was unable to be performed; a normal value being less than 25. Fractional exhaled nitric oxide (FENO) is regarded as a simple, noninvasive method for assessing eosinophilic airway inflammation. Produced by a variety of cells within the lung, nitric oxide (NO) concentrations are generally low in healthy individuals. However, high concentrations of NO appear to be involved in nonspecific host defense mechanisms and chronic inflammatory  diseases such as asthma. The American Thoracic Society (ATS) therefore recommended using FENO to aid in the diagnosis and monitoring of eosinophilic airway inflammation and asthma, and for identifying steroid responsive individuals whose chronic respiratory symptoms may be caused by airway inflammation \par \par PFT revealed normal flows, with a FEV1 of 2.25L, which is 83% of predicted, with a normal flow volume loop. -PFTs performed today for evaluation of asthma (05/18/2023) \par \par CT (1/7/23) revealed damion emphysema and chronic inflammatory bronchial changes. No suspicious lung lesion. Lung RADS: 2

## 2023-05-18 NOTE — HISTORY OF PRESENT ILLNESS
[TextBox_4] : Mr. TAYLOR is a 73 year old male presenting to the office today for a follow up pulmonary follow up. His chief complaint is\par \par -he notes feeling generally well \par -he notes SOB when walking fast\par -he notes his weight is stable \par -he notes his bowels are regular \par -he notes hoarseness when talking for a long time\par -he notes taking Prilosec every day\par -he notes sleep is better\par \par -patient denies any headaches, nausea, vomiting, fever, chills, sweats, chest pain, chest pressure, palpitations, coughing, wheezing, fatigue, diarrhea, constipation, dysphagia, myalgias, dizziness, leg swelling, leg pain, itchy eyes, itchy ears,

## 2023-09-11 ENCOUNTER — NON-APPOINTMENT (OUTPATIENT)
Age: 73
End: 2023-09-11

## 2023-09-13 ENCOUNTER — APPOINTMENT (OUTPATIENT)
Dept: PULMONOLOGY | Facility: CLINIC | Age: 73
End: 2023-09-13
Payer: MEDICARE

## 2023-09-13 VITALS
TEMPERATURE: 97.6 F | BODY MASS INDEX: 25.39 KG/M2 | OXYGEN SATURATION: 98 % | HEART RATE: 68 BPM | HEIGHT: 66 IN | WEIGHT: 158 LBS | SYSTOLIC BLOOD PRESSURE: 128 MMHG | DIASTOLIC BLOOD PRESSURE: 70 MMHG | RESPIRATION RATE: 16 BRPM

## 2023-09-13 VITALS
SYSTOLIC BLOOD PRESSURE: 120 MMHG | RESPIRATION RATE: 16 BRPM | BODY MASS INDEX: 21.43 KG/M2 | DIASTOLIC BLOOD PRESSURE: 72 MMHG | WEIGHT: 125.5 LBS | HEART RATE: 65 BPM | OXYGEN SATURATION: 98 % | TEMPERATURE: 97.5 F | HEIGHT: 64 IN

## 2023-09-13 DIAGNOSIS — Z72.820 SLEEP DEPRIVATION: ICD-10-CM

## 2023-09-13 PROCEDURE — 95012 NITRIC OXIDE EXP GAS DETER: CPT

## 2023-09-13 PROCEDURE — ZZZZZ: CPT

## 2023-09-13 PROCEDURE — 99214 OFFICE O/P EST MOD 30 MIN: CPT | Mod: 25

## 2023-09-13 PROCEDURE — 94727 GAS DIL/WSHOT DETER LNG VOL: CPT

## 2023-09-13 PROCEDURE — 94010 BREATHING CAPACITY TEST: CPT

## 2023-09-13 PROCEDURE — 94729 DIFFUSING CAPACITY: CPT

## 2023-09-13 RX ORDER — TIOTROPIUM BROMIDE AND OLODATEROL 3.124; 2.736 UG/1; UG/1
2.5-2.5 SPRAY, METERED RESPIRATORY (INHALATION) DAILY
Qty: 3 | Refills: 1 | Status: ACTIVE | COMMUNITY
Start: 2023-09-13 | End: 1900-01-01

## 2023-09-13 RX ORDER — LEVALBUTEROL TARTRATE 45 UG/1
45 AEROSOL, METERED ORAL
Qty: 3 | Refills: 1 | Status: ACTIVE | COMMUNITY
Start: 2023-09-13 | End: 1900-01-01

## 2023-09-18 RX ORDER — BUDESONIDE, GLYCOPYRROLATE, AND FORMOTEROL FUMARATE 160; 9; 4.8 UG/1; UG/1; UG/1
160-9-4.8 AEROSOL, METERED RESPIRATORY (INHALATION)
Qty: 3 | Refills: 1 | Status: ACTIVE | COMMUNITY
Start: 2021-06-10 | End: 1900-01-01

## 2023-10-21 ENCOUNTER — RX RENEWAL (OUTPATIENT)
Age: 73
End: 2023-10-21

## 2023-10-21 RX ORDER — ROFLUMILAST 500 UG/1
500 TABLET ORAL
Qty: 90 | Refills: 1 | Status: ACTIVE | COMMUNITY
Start: 2022-12-21 | End: 1900-01-01

## 2024-01-26 ENCOUNTER — INPATIENT (INPATIENT)
Facility: HOSPITAL | Age: 74
LOS: 0 days | Discharge: HOME | DRG: 135 | End: 2024-01-27
Attending: SURGERY | Admitting: SURGERY
Payer: COMMERCIAL

## 2024-01-26 VITALS
RESPIRATION RATE: 18 BRPM | DIASTOLIC BLOOD PRESSURE: 77 MMHG | HEART RATE: 76 BPM | OXYGEN SATURATION: 98 % | SYSTOLIC BLOOD PRESSURE: 153 MMHG | TEMPERATURE: 98 F
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
## 2024-01-26 DIAGNOSIS — S22.20XA UNSPECIFIED FRACTURE OF STERNUM, INITIAL ENCOUNTER FOR CLOSED FRACTURE: ICD-10-CM

## 2024-01-26 DIAGNOSIS — J44.9 CHRONIC OBSTRUCTIVE PULMONARY DISEASE, UNSPECIFIED: ICD-10-CM

## 2024-01-26 DIAGNOSIS — Z98.89 OTHER SPECIFIED POSTPROCEDURAL STATES: Chronic | ICD-10-CM

## 2024-01-26 DIAGNOSIS — R07.9 CHEST PAIN, UNSPECIFIED: ICD-10-CM

## 2024-01-26 LAB
ALBUMIN SERPL ELPH-MCNC: 3.6 G/DL — SIGNIFICANT CHANGE UP (ref 3.3–5)
ALP SERPL-CCNC: 100 U/L — SIGNIFICANT CHANGE UP (ref 40–120)
ALT FLD-CCNC: 42 U/L — SIGNIFICANT CHANGE UP (ref 12–78)
ANION GAP SERPL CALC-SCNC: 3 MMOL/L — LOW (ref 5–17)
AST SERPL-CCNC: 26 U/L — SIGNIFICANT CHANGE UP (ref 15–37)
BASOPHILS # BLD AUTO: 0.06 K/UL — SIGNIFICANT CHANGE UP (ref 0–0.2)
BASOPHILS NFR BLD AUTO: 0.7 % — SIGNIFICANT CHANGE UP (ref 0–2)
BILIRUB SERPL-MCNC: 0.5 MG/DL — SIGNIFICANT CHANGE UP (ref 0.2–1.2)
BUN SERPL-MCNC: 10 MG/DL — SIGNIFICANT CHANGE UP (ref 7–23)
CALCIUM SERPL-MCNC: 9.1 MG/DL — SIGNIFICANT CHANGE UP (ref 8.5–10.1)
CHLORIDE SERPL-SCNC: 108 MMOL/L — SIGNIFICANT CHANGE UP (ref 96–108)
CK SERPL-CCNC: 116 U/L — SIGNIFICANT CHANGE UP (ref 26–308)
CO2 SERPL-SCNC: 29 MMOL/L — SIGNIFICANT CHANGE UP (ref 22–31)
CREAT SERPL-MCNC: 0.9 MG/DL — SIGNIFICANT CHANGE UP (ref 0.5–1.3)
EGFR: 91 ML/MIN/1.73M2 — SIGNIFICANT CHANGE UP
EOSINOPHIL # BLD AUTO: 0.11 K/UL — SIGNIFICANT CHANGE UP (ref 0–0.5)
EOSINOPHIL NFR BLD AUTO: 1.3 % — SIGNIFICANT CHANGE UP (ref 0–6)
FLUAV AG NPH QL: SIGNIFICANT CHANGE UP
FLUBV AG NPH QL: SIGNIFICANT CHANGE UP
GLUCOSE SERPL-MCNC: 119 MG/DL — HIGH (ref 70–99)
HCT VFR BLD CALC: 48.9 % — SIGNIFICANT CHANGE UP (ref 39–50)
HGB BLD-MCNC: 16.8 G/DL — SIGNIFICANT CHANGE UP (ref 13–17)
IMM GRANULOCYTES NFR BLD AUTO: 1.6 % — HIGH (ref 0–0.9)
LIDOCAIN IGE QN: 37 U/L — SIGNIFICANT CHANGE UP (ref 13–75)
LYMPHOCYTES # BLD AUTO: 1.44 K/UL — SIGNIFICANT CHANGE UP (ref 1–3.3)
LYMPHOCYTES # BLD AUTO: 16.4 % — SIGNIFICANT CHANGE UP (ref 13–44)
MCHC RBC-ENTMCNC: 31.4 PG — SIGNIFICANT CHANGE UP (ref 27–34)
MCHC RBC-ENTMCNC: 34.4 GM/DL — SIGNIFICANT CHANGE UP (ref 32–36)
MCV RBC AUTO: 91.4 FL — SIGNIFICANT CHANGE UP (ref 80–100)
MONOCYTES # BLD AUTO: 0.7 K/UL — SIGNIFICANT CHANGE UP (ref 0–0.9)
MONOCYTES NFR BLD AUTO: 8 % — SIGNIFICANT CHANGE UP (ref 2–14)
NEUTROPHILS # BLD AUTO: 6.35 K/UL — SIGNIFICANT CHANGE UP (ref 1.8–7.4)
NEUTROPHILS NFR BLD AUTO: 72 % — SIGNIFICANT CHANGE UP (ref 43–77)
PLATELET # BLD AUTO: 313 K/UL — SIGNIFICANT CHANGE UP (ref 150–400)
POTASSIUM SERPL-MCNC: 4.1 MMOL/L — SIGNIFICANT CHANGE UP (ref 3.5–5.3)
POTASSIUM SERPL-SCNC: 4.1 MMOL/L — SIGNIFICANT CHANGE UP (ref 3.5–5.3)
PROT SERPL-MCNC: 7.7 GM/DL — SIGNIFICANT CHANGE UP (ref 6–8.3)
RBC # BLD: 5.35 M/UL — SIGNIFICANT CHANGE UP (ref 4.2–5.8)
RBC # FLD: 13.1 % — SIGNIFICANT CHANGE UP (ref 10.3–14.5)
RSV RNA NPH QL NAA+NON-PROBE: SIGNIFICANT CHANGE UP
SARS-COV-2 RNA SPEC QL NAA+PROBE: SIGNIFICANT CHANGE UP
SODIUM SERPL-SCNC: 140 MMOL/L — SIGNIFICANT CHANGE UP (ref 135–145)
TROPONIN I, HIGH SENSITIVITY RESULT: 6.93 NG/L — SIGNIFICANT CHANGE UP
TROPONIN I, HIGH SENSITIVITY RESULT: 6.96 NG/L — SIGNIFICANT CHANGE UP
WBC # BLD: 8.8 K/UL — SIGNIFICANT CHANGE UP (ref 3.8–10.5)
WBC # FLD AUTO: 8.8 K/UL — SIGNIFICANT CHANGE UP (ref 3.8–10.5)

## 2024-01-26 PROCEDURE — 86803 HEPATITIS C AB TEST: CPT

## 2024-01-26 PROCEDURE — 83735 ASSAY OF MAGNESIUM: CPT

## 2024-01-26 PROCEDURE — G1004: CPT

## 2024-01-26 PROCEDURE — 84100 ASSAY OF PHOSPHORUS: CPT

## 2024-01-26 PROCEDURE — 70450 CT HEAD/BRAIN W/O DYE: CPT | Mod: 26,MG

## 2024-01-26 PROCEDURE — 93308 TTE F-UP OR LMTD: CPT | Mod: 26

## 2024-01-26 PROCEDURE — 85027 COMPLETE CBC AUTOMATED: CPT

## 2024-01-26 PROCEDURE — 0241U: CPT

## 2024-01-26 PROCEDURE — 99285 EMERGENCY DEPT VISIT HI MDM: CPT

## 2024-01-26 PROCEDURE — 93010 ELECTROCARDIOGRAM REPORT: CPT

## 2024-01-26 PROCEDURE — 84484 ASSAY OF TROPONIN QUANT: CPT

## 2024-01-26 PROCEDURE — C9113: CPT

## 2024-01-26 PROCEDURE — 74177 CT ABD & PELVIS W/CONTRAST: CPT | Mod: 26,MG

## 2024-01-26 PROCEDURE — 36415 COLL VENOUS BLD VENIPUNCTURE: CPT

## 2024-01-26 PROCEDURE — 82550 ASSAY OF CK (CPK): CPT

## 2024-01-26 PROCEDURE — 99053 MED SERV 10PM-8AM 24 HR FAC: CPT

## 2024-01-26 PROCEDURE — 93308 TTE F-UP OR LMTD: CPT

## 2024-01-26 PROCEDURE — 72125 CT NECK SPINE W/O DYE: CPT | Mod: 26,MG

## 2024-01-26 PROCEDURE — 94640 AIRWAY INHALATION TREATMENT: CPT

## 2024-01-26 PROCEDURE — 99254 IP/OBS CNSLTJ NEW/EST MOD 60: CPT

## 2024-01-26 PROCEDURE — 71260 CT THORAX DX C+: CPT | Mod: 26,MG

## 2024-01-26 PROCEDURE — 80048 BASIC METABOLIC PNL TOTAL CA: CPT

## 2024-01-26 RX ORDER — SODIUM CHLORIDE 9 MG/ML
1000 INJECTION, SOLUTION INTRAVENOUS
Refills: 0 | Status: DISCONTINUED | OUTPATIENT
Start: 2024-01-26 | End: 2024-01-27

## 2024-01-26 RX ORDER — OXYCODONE HYDROCHLORIDE 5 MG/1
10 TABLET ORAL EVERY 4 HOURS
Refills: 0 | Status: DISCONTINUED | OUTPATIENT
Start: 2024-01-26 | End: 2024-01-27

## 2024-01-26 RX ORDER — TIOTROPIUM BROMIDE AND OLODATEROL 3.124; 2.736 UG/1; UG/1
2 SPRAY, METERED RESPIRATORY (INHALATION) DAILY
Refills: 0 | Status: DISCONTINUED | OUTPATIENT
Start: 2024-01-26 | End: 2024-01-27

## 2024-01-26 RX ORDER — SENNA PLUS 8.6 MG/1
2 TABLET ORAL AT BEDTIME
Refills: 0 | Status: DISCONTINUED | OUTPATIENT
Start: 2024-01-26 | End: 2024-01-27

## 2024-01-26 RX ORDER — ONDANSETRON 8 MG/1
4 TABLET, FILM COATED ORAL ONCE
Refills: 0 | Status: COMPLETED | OUTPATIENT
Start: 2024-01-26 | End: 2024-01-26

## 2024-01-26 RX ORDER — TIOTROPIUM BROMIDE AND OLODATEROL 3.124; 2.736 UG/1; UG/1
2 SPRAY, METERED RESPIRATORY (INHALATION)
Refills: 0 | DISCHARGE

## 2024-01-26 RX ORDER — MORPHINE SULFATE 50 MG/1
4 CAPSULE, EXTENDED RELEASE ORAL ONCE
Refills: 0 | Status: DISCONTINUED | OUTPATIENT
Start: 2024-01-26 | End: 2024-01-26

## 2024-01-26 RX ORDER — SUCRALFATE 1 G
1 TABLET ORAL ONCE
Refills: 0 | Status: COMPLETED | OUTPATIENT
Start: 2024-01-26 | End: 2024-01-26

## 2024-01-26 RX ORDER — ACETAMINOPHEN 500 MG
1000 TABLET ORAL ONCE
Refills: 0 | Status: DISCONTINUED | OUTPATIENT
Start: 2024-01-26 | End: 2024-01-27

## 2024-01-26 RX ORDER — TAMSULOSIN HYDROCHLORIDE 0.4 MG/1
1 CAPSULE ORAL
Refills: 0 | DISCHARGE

## 2024-01-26 RX ORDER — OXYCODONE HYDROCHLORIDE 5 MG/1
5 TABLET ORAL EVERY 4 HOURS
Refills: 0 | Status: DISCONTINUED | OUTPATIENT
Start: 2024-01-26 | End: 2024-01-27

## 2024-01-26 RX ORDER — PANTOPRAZOLE SODIUM 20 MG/1
40 TABLET, DELAYED RELEASE ORAL DAILY
Refills: 0 | Status: DISCONTINUED | OUTPATIENT
Start: 2024-01-26 | End: 2024-01-27

## 2024-01-26 RX ORDER — ALBUTEROL 90 UG/1
2 AEROSOL, METERED ORAL EVERY 6 HOURS
Refills: 0 | Status: DISCONTINUED | OUTPATIENT
Start: 2024-01-26 | End: 2024-01-26

## 2024-01-26 RX ORDER — ACETAMINOPHEN 500 MG
1000 TABLET ORAL ONCE
Refills: 0 | Status: COMPLETED | OUTPATIENT
Start: 2024-01-26 | End: 2024-01-26

## 2024-01-26 RX ORDER — TAMSULOSIN HYDROCHLORIDE 0.4 MG/1
0.4 CAPSULE ORAL AT BEDTIME
Refills: 0 | Status: DISCONTINUED | OUTPATIENT
Start: 2024-01-26 | End: 2024-01-27

## 2024-01-26 RX ADMIN — OXYCODONE HYDROCHLORIDE 10 MILLIGRAM(S): 5 TABLET ORAL at 15:59

## 2024-01-26 RX ADMIN — Medication 1000 MILLIGRAM(S): at 07:26

## 2024-01-26 RX ADMIN — SODIUM CHLORIDE 100 MILLILITER(S): 9 INJECTION, SOLUTION INTRAVENOUS at 21:59

## 2024-01-26 RX ADMIN — MORPHINE SULFATE 4 MILLIGRAM(S): 50 CAPSULE, EXTENDED RELEASE ORAL at 08:37

## 2024-01-26 RX ADMIN — Medication 400 MILLIGRAM(S): at 06:56

## 2024-01-26 RX ADMIN — TAMSULOSIN HYDROCHLORIDE 0.4 MILLIGRAM(S): 0.4 CAPSULE ORAL at 15:43

## 2024-01-26 RX ADMIN — ONDANSETRON 4 MILLIGRAM(S): 8 TABLET, FILM COATED ORAL at 10:59

## 2024-01-26 RX ADMIN — OXYCODONE HYDROCHLORIDE 10 MILLIGRAM(S): 5 TABLET ORAL at 21:10

## 2024-01-26 RX ADMIN — OXYCODONE HYDROCHLORIDE 10 MILLIGRAM(S): 5 TABLET ORAL at 10:30

## 2024-01-26 RX ADMIN — OXYCODONE HYDROCHLORIDE 10 MILLIGRAM(S): 5 TABLET ORAL at 20:22

## 2024-01-26 RX ADMIN — MORPHINE SULFATE 4 MILLIGRAM(S): 50 CAPSULE, EXTENDED RELEASE ORAL at 08:07

## 2024-01-26 RX ADMIN — TAMSULOSIN HYDROCHLORIDE 0.4 MILLIGRAM(S): 0.4 CAPSULE ORAL at 21:59

## 2024-01-26 RX ADMIN — SODIUM CHLORIDE 100 MILLILITER(S): 9 INJECTION, SOLUTION INTRAVENOUS at 10:02

## 2024-01-26 RX ADMIN — OXYCODONE HYDROCHLORIDE 10 MILLIGRAM(S): 5 TABLET ORAL at 09:52

## 2024-01-26 NOTE — ED ADULT NURSE NOTE - CHPI ED NUR SYMPTOMS NEG
no acting out behaviors/no bruising/no decreased eating/drinking/no difficulty bearing weight/no disorientation/no dizziness/no fussiness/no headache/no laceration/no loss of consciousness/no neck tenderness/no sleeping issues
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
## 2024-01-26 NOTE — H&P ADULT - NSVTERISKASSESS_GEN_ALL_CORE FT
10/06/23  2245 10/05/23  2238 10/05/23  0442   10/06/23  2244 10/05/23  2238 10/05/23  0442   [id number]  (Abnormal)  (Susceptibility) Collected: 10/05/Dragon     Surgical Assessment Completed on: 26-Jan-2024 08:27
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
## 2024-01-26 NOTE — ED PROVIDER NOTE - CADM POA PRESS ULCER
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 ([facility] )    [facility]uppression ([facility]    Encounter for follow-up examination after completed treatment for malignant neoplasm    Essential hypertension    SOB (shortness of breath)    Left lumbar radiculitis    Acute drug-induced gout of right foot    Chronic left shoulder pain    Impingement syndrome of left shoulder    Knee pain, right    Unstable angina ([facility])    Chronic combined systolic and diastolic congestive heart failure, NYHA class 4 ([facility])    Morbid (severe) obesity due to excess calories ([facility])    Panlobular emphysema ([facility] )    Gout    Lumbar spondylosis    Spinal stenosis of lumbar region    DDD (degenerative disc disease), lumbar    Low back pain with sciatica    Claustrophobia    Neck pain, acute    Cervical paraspinal muscle spasm    Acute diverticulitis       Past Medical History:   Diagnosis Date    Achilles tendinitis, unspecified leg     Last assessed - 4/29/14    Actinic keratosis     Scalp and face    Acute MI, inferolateral wall ([facility] ) 1/2/2018    Anxiety     Arthritis     Arthritis of shoulder region, degenerative     Last assessed - 7/23/15    Bleeding from anus     Bone spur     Last assessed - 4/29/14    CHF (congestive heart failure) ([facility])     Chronic pain disorder     lumbar    Closed displaced fracture of fifth metatarsal bone of left foot with routine healing     Last assessed - 4/20/16    Coronary artery disease     Degenerative joint disease (DJD) of hip     Last assessed - 4/1/15    Displaced fracture of fifth metatarsal bone, left foot, initial encounter for closed fracture     Last assessed - 5/13/16    Displaced fracture of fourth metatarsal bone, left foot, initial encounter for closed fracture     Last assessed - 5/13/16    Dyspnea on exertion     current 4/2021    GERD (gastroesophageal reflux disease)     Gout     Last assessed - 4/29/14    H/O angioplasty     heart attack    H/O kidney transplant 2007    Herpes zoster     History of heart transplant ([facility] ) 12/04/1997    [facility]; acute rejection in 2006    History of transfusion 1997    during heart transplant, no rx    Hyperlipidemia     Hypertension     Mass of face     Last assessed - 12/29/16    Myocardial infarction ([facility] )     Past heart attack     [id number]  [id number]    Recurrent UTI     Last assessed - 1/28/16    Renal disorder     currently only one functional kidney    S/P CABG x 3     03/22/1982    Skin lesion of right lower extremity     Resolved - 8/4/16    Sleep apnea     Small bowel obstruction (HCC)     Last assessed - 11/4/16    Solitary kidney, acquired     Umbilical hernia     Ventral hernia     Last assessed - 1/28/16    Vesico-ureteral reflux     Last assessed - 12/21/15       Past Surgical History:   Procedure Laterality Date    CATARACT EXTRACTION Bilateral     CATARACT EXTRACTION, BILATERAL      CHOLECYSTECTOMY      COLONOSCOPY      CORONARY ANGIOPLASTY WITH STENT PLACEMENT  02/2019    CORONARY ARTERY BYPASS GRAFT  03/1982    x3    EGD AND COLONOSCOPY N/A 7/17/2018    Procedure: EGD AND COLONOSCOPY;  Surgeon: Michele Flores DO;  [facility]: [facility];   Service: Gastroenterology    ESOPHAGOGASTRODUODENOSCOPY      FLAP LOCAL HEAD / NECK N/A 4/29/2021    Procedure: FLAP X2 SCALP;  Surgeon: Jermaine Rooney MD;  Location:  MAIN OR;  Service: Plastics    FULL THICKNESS SKIN GRAFT Left 1/27/2017    Procedure: NASAL RADIX DEFECT RECONSTRUCTION; FULL THICKNESS SKIN GRAFT ;  Surgeon: Binh Tovar MD;  [facility];  Service:     FULL THICKNESS SKIN GRAFT Right 9/11/2017    Procedure: FULL THICKNESS SKIN GRAFT VERSUS FLAP RECONSTRUCTION;  Surgeon: Binh Tovar MD;  [facility];  Service: Plastics    HEART TRANSPLANT  12/04/1997    HERNIA REPAIR      chest hernia in [address] [facility] N/A 10/24/2016    Procedure: Exploratory laparotomy, lysis of adhesions  ;  Surgeon: Elbert Garcias MD;  Location: [facility];  Service:     MOHS RECONSTRUCTION N/A 6/28/2016    Procedure: RECONSTRUCTION MOHS DEFECT; NASAL ROOT; NASAL ALA with flap and skin graft;  Surgeon: Binh Tovar MD;  [facility]: [facility];  Service:     MOHS RECONSTRUCTION N/A 4/29/2021    Procedure: RECONSTRUCTION MOHS DEFECT X3 SCALP;  Surgeon: Binh Tovar MD;  [facility]: [facility];  Service: Plastics    WI DELAY/SECTN FLAP LID,NOS,EAR,LIP N/A 2/16/2017    Procedure: DIVISION/INSET FOREHEAD FLAP TO NOSE;  Surgeon: Binh Tovar MD;  [facility];  Service: Plastics    WI [address] [place] [address] Dr <0 5 CM FACE,FACIAL Left 1/27/2017    Procedure: NASAL SIDE WALL SQUAMOUS CELL CANCER WIDE EXCISION ;  Surgeon: Farideh Posada MD;  [facility];  Service: Surgical Oncology    WI EXC SKIN MALIG <0 5 CM REMAINDER BODY N/A 6/29/2017    Procedure: SCALP EXCISION SQUAMOUS CELL CANCER;  Surgeon: Farideh Posada MD;  [facility];  Service: Surgical Oncology    WI EXC SKIN MALIG >4 CM FACE,FACIAL Right 9/11/2017    Procedure: EAR SCC IN SITU EXCISION; FROZEN SECTION;  Surgeon: Binh Tovar MD;  [facility];  Service: Plastics    WI SPLIT GRFT,HEAD,FAC,HAND,FEET <100 SQCM N/A 6/29/2017    Procedure: SCALP DEFECT RECONSTRUCTION; SPLIT THICKNESS SKIN GRAFT;  Surgeon: Binh Tovar MD;  [facility];  Service: Plastics    SKIN BIOPSY  05/12/2016    Nasal root and Lt ala     SKIN CANCER EXCISION Bilateral 01/06/2021 cancer remover from lip    SKIN LESION EXCISION      Nose    TONSILLECTOMY      TRANSPLANTATION RENAL  12/29/2006    TRANSPLANTATION RENAL  09/14/2007         Current Outpatient Medications:     allopurinol (ZYLOPRIM) 100 mg tablet, Take 200 mg by mouth daily , Disp: , Rfl:     amitriptyline (ELAVIL) 25 mg tablet, Take 25 mg by mouth daily at bedtime  , Disp: , Rfl:     aspirin 81 MG tablet, Take 81 mg by mouth daily, Disp: , Rfl:     atorvastatin (LIPITOR) 40 mg tablet, Take 40 mg by mouth daily, Disp: , Rfl:     Calcium Carbonate 1500 (600 Ca) MG TABS, Take 600 mg by mouth daily , Disp: , Rfl:     carvedilol (COREG) 12 5 mg tablet, Take 1 tablet (12 5 mg total) by mouth 2 (two) times a day with meals (Patient not taking: Reported on 7/6/2021), Disp: 60 tablet, Rfl: 0    carvedilol (COREG) 25 mg tablet, Take 25 mg by mouth 2 (two) times a day with meals, Disp: , Rfl:     clopidogrel (PLAVIX) 75 mg tablet, TAKE 1 TABLET EVERY DAY, Disp: 90 tablet, Rfl: 4    Diclofenac Sodium (Voltaren) 1 %, Apply 2 g topically as needed , Disp: , Rfl:     furosemide (LASIX) 20 mg tablet, TAKE 2 TABLETS (40 MG TOTAL) BY MOUTH DAILY, Disp: 180 tablet, Rfl: 3    hydrALAZINE (APRESOLINE) 25 mg tablet, TAKE 1 TABLET EVERY 8 HOURS, Disp: 270 tablet, Rfl: 3    isosorbide mononitrate (IMDUR) 120 mg 24 hr tablet, TAKE 1 TABLET (120 MG TOTAL) BY MOUTH DAILY, Disp: 90 tablet, Rfl: 3    LORazepam (ATIVAN) 0 5 mg tablet, Take 0 5 tablets (0 25 mg total) by mouth once as needed for anxiety (Take 20-30 minutes prior to CT) for up to 1 dose, Disp: 1 tablet, Rfl: 0    multivitamin (THERAGRAN) TABS, Take 1 tablet by mouth daily  , Disp: , Rfl:     mycophenolic acid (MYFORTIC) 872 mg EC tablet, Take 180 mg by mouth 2 (two) times a day  , Disp: , Rfl:     naloxone (NARCAN) 4 mg/0 1 mL nasal spray, Administer 1 spray into a nostril  If no response after 2-3 minutes, give another dose in the other nostril using a new spray   (Patient not taking: Reported on 6/17/2021), Disp: 1 each, Rfl: 1    nitroglycerin (NITROSTAT) 0 4 mg SL tablet, PLACE 1 TABLET (0 4 MG TOTAL) UNDER THE TONGUE EVERY 5 (FIVE) MINUTES AS NEEDED FOR CHEST PAIN, Disp: 25 tablet, Rfl: 4    omega-3-acid ethyl esters (LOVAZA) 1 g capsule, Take 2 g by mouth daily  , Disp: , Rfl:     omeprazole (PriLOSEC) 20 mg delayed release capsule, Take 2 capsules (40 mg total) by mouth every evening, Disp: 30 capsule, Rfl: 0    prednisoLONE acetate (PRED FORTE) 1 % ophthalmic suspension, INSTILL 1 DROP FOUR TIMES DAILY IN TO SURGERY EYE, Disp: , Rfl:     predniSONE 2 5 mg tablet, Take 2 5 mg by mouth daily, Disp: , Rfl:     tacrolimus (PROGRAF) 1 mg capsule, Take 2 mg by mouth 2 (two) times a day , Disp: , Rfl:     traMADol (ULTRAM) 50 mg tablet, Take 1 tablet (50 mg total) by mouth every 6 (six) hours as needed for moderate pain, Disp: 120 tablet, Rfl: 0    zolpidem (AMBIEN) 10 mg tablet, Take 10 mg by mouth daily at bedtime  , Disp: , Rfl:     Current Facility-Administered Medications:     bupivacaine (PF) (MARCAINE) 0 5 % injection 30 mL, 30 mL, Injection, Once, Nicolás Keith, DO    lidocaine (PF) (XYLOCAINE-MPF) 1 % injection 10 mL, 10 mL, Other, Once, Nicolás Keith, DO    Allergies   Allergen Reactions    Aspartame - Food Allergy Rash    Atenolol Other (See Comments)     Category: Allergy; Annotation - [id number]: all forms  Edema of skin    Category: Allergy; Annotation - [id number]: all forms  Edema of skin    Cyclosporine Diarrhea    Monosodium Glutamate - Food Allergy Rash    Morphine Other (See Comments) and Hallucinations     Hallucinations  Hallucinations    Penicillins Rash and Other (See Comments)     Category: Allergy; Annotation - [id number]: all forms  md cerad meropenem  Category: Allergy;  Annotation - [id number]: all forms    Sucralose - Food Allergy Rash    Sulfa Antibiotics Rash       Physical Exam:   Vitals:    07/12/21 1535   BP: 125/86   Pulse: 87   Resp: 20 Temp: 98 9 °F (37 2 °C)   SpO2: 95%     General: Awake, Alert, Oriented x 3, Mood and affect appropriate  Respiratory: Respirations even and unlabored  Cardiovascular: Peripheral pulses intact; no edema  Musculoskeletal Exam:   Bilateral lumbar paraspinals tender to palpation      ASA Score: 3         Assessment:   Lumbar spondylosis    Plan: B/L L2, L3, L4, L5 MBB # 2 Unable to assess at this time due to patient’s acuity

## 2024-01-26 NOTE — H&P ADULT - NSHPPHYSICALEXAM_GEN_ALL_CORE
· CONSTITUTIONAL: Well appearing, awake, alert, oriented to person, place, time/situation and in no apparent distress.  · EYES: Clear bilaterally, pupils equal, round and reactive to light.  · CARDIAC: Normal rate, regular rhythm.  Heart sounds S1, S2.  No murmurs, rubs or gallops.  · RESPIRATORY: Breath sounds clear and equal bilaterally.  · GASTROINTESTINAL: Abdomen soft, non-tender, no guarding.  · MUSCULOSKELETAL: Spine appears normal, range of motion is not limited, no muscle or joint tenderness  · NEUROLOGICAL: Alert and oriented, no focal deficits, no motor or sensory deficits.  · SKIN: Skin normal color for race, warm, dry and intact. No evidence of rash. GEN- Alert and conversant in NAD   Vital Signs Last 24 Hrs  T(C): 36.3 (26 Jan 2024 07:10), Max: 36.6 (26 Jan 2024 06:12)  T(F): 97.4 (26 Jan 2024 07:10), Max: 97.8 (26 Jan 2024 06:12)  HR: 74 (26 Jan 2024 07:10) (74 - 76)  BP: 146/78 (26 Jan 2024 07:10) (146/78 - 153/77)  BP(mean): 97 (26 Jan 2024 07:10) (97 - 97)  RR: 18 (26 Jan 2024 07:10) (18 - 18)  SpO2: 98% (26 Jan 2024 07:10) (98% - 98%)    Parameters below as of 26 Jan 2024 07:10  Patient On (Oxygen Delivery Method): room air    CONSTITUTIONAL: Well appearing, awake, alert, oriented to person, place, time/situation and in no apparent distress.  EYES: Clear bilaterally, pupils equal, round and reactive to light.  NECK- no C Spine tenderness   CARDIAC: Normal rate, regular rhythm.  Heart sounds S1, S2.  No murmurs, rubs or gallops.  RESPIRATORY: Breath sounds clear and equal bilaterally.- pont tenderness over sternum- no ecchymosis noted no rib tenderness.   GASTROINTESTINAL: BS + Abdomen soft, non-tender, no guarding.  MUSCULOSKELETAL: Spine appears normal, range of motion is not limited, no muscle or joint tenderness. Pelvis non tender    NEUROLOGICAL: Alert and oriented, no focal deficits, no motor or sensory deficits.   SKIN: Skin normal color for race, warm, dry and intact. No evidence of rash. GEN- Alert and conversant in NAD   Vital Signs Last 24 Hrs  T(C): 36.3 (26 Jan 2024 07:10), Max: 36.6 (26 Jan 2024 06:12)  T(F): 97.4 (26 Jan 2024 07:10), Max: 97.8 (26 Jan 2024 06:12)  HR: 74 (26 Jan 2024 07:10) (74 - 76)  BP: 146/78 (26 Jan 2024 07:10) (146/78 - 153/77)  BP(mean): 97 (26 Jan 2024 07:10) (97 - 97)  RR: 18 (26 Jan 2024 07:10) (18 - 18)  SpO2: 98% (26 Jan 2024 07:10) (98% - 98%)    Parameters below as of 26 Jan 2024 07:10  Patient On (Oxygen Delivery Method): room air    CONSTITUTIONAL: Well appearing, awake, alert, oriented to person, place, time/situation and in no apparent distress.  EYES: Clear bilaterally, pupils equal, round and reactive to light.  NECK- no C Spine tenderness   CARDIAC: Normal rate, regular rhythm.  Heart sounds S1, S2.  No murmurs, rubs or gallops.  RESPIRATORY: Breath sounds clear and equal bilaterally.- pont tenderness over sternum- no ecchymosis noted no rib tenderness.   GASTROINTESTINAL: BS + Abdomen soft, non-tender, no guarding.  MUSCULOSKELETAL: Spine non tender , range of motion is not limited, no muscle or joint tenderness. Pelvis non tender   EXT: no CCE non tender, palpable DP bilat   NEUROLOGICAL: Alert and oriented, no focal deficits, no motor or sensory deficits.   SKIN: Skin normal color for race, warm, dry and intact. No evidence of rash. GEN- Alert and conversant in NAD   Vital Signs Last 24 Hrs  T(C): 36.3 (26 Jan 2024 07:10), Max: 36.6 (26 Jan 2024 06:12)  T(F): 97.4 (26 Jan 2024 07:10), Max: 97.8 (26 Jan 2024 06:12)  HR: 74 (26 Jan 2024 07:10) (74 - 76)  BP: 146/78 (26 Jan 2024 07:10) (146/78 - 153/77)  BP(mean): 97 (26 Jan 2024 07:10) (97 - 97)  RR: 18 (26 Jan 2024 07:10) (18 - 18)  SpO2: 98% (26 Jan 2024 07:10) (98% - 98%)    Parameters below as of 26 Jan 2024 07:10  Patient On (Oxygen Delivery Method): room air  GCS 15  Airway intact  Breathing spontaneously  Circulation intact    CONSTITUTIONAL: Well appearing, awake, alert, oriented to person, place, time/situation and in no apparent distress.  EYES: Clear bilaterally, pupils equal, round and reactive to light.  NECK- no C Spine tenderness , trachea midline, supple  CARDIAC: Normal rate, regular rhythm.  Heart sounds S1, S2.  No murmurs, rubs or gallops.  RESPIRATORY: Breath sounds clear and equal bilaterally.- pont tenderness over sternum- no ecchymosis noted no rib tenderness. tender over sternum  GASTROINTESTINAL: BS + Abdomen soft, non-tender, no guarding.  MUSCULOSKELETAL: Spine non tender , range of motion is not limited, no muscle or joint tenderness. Pelvis non tender   EXT: no CCE non tender, palpable DP bilat   NEUROLOGICAL: Alert and oriented, no focal deficits, no motor or sensory deficits.   SKIN: Skin normal color for race, warm, dry and intact. No evidence of rash.  Rectal : deferred  : no blood at meatus, normal genitilia
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
## 2024-01-26 NOTE — ED ADULT NURSE NOTE - NSFALLRISKINTERV_ED_ALL_ED

## 2024-01-26 NOTE — ED ADULT NURSE REASSESSMENT NOTE - NS ED NURSE REASSESS COMMENT FT1
Report received from ADELIA Lott. Pt A&Ox4, resting comfortably in stretcher. Pending CT results. Pt and wife updated on plan of care, verbalized understanding. Report received from ADELIA Lott. Pt A&Ox4, resting comfortably in stretcher. Pt c/o left-sided chest pain, redness noted. Ofirmev infusing. Pt ambulated to bathroom with steady gait. Pending CT results. Pt, wife, and son updated on plan of care, verbalized understanding.
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
## 2024-01-26 NOTE — PATIENT PROFILE ADULT - FALL HARM RISK - HARM RISK INTERVENTIONS
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
## 2024-01-27 ENCOUNTER — TRANSCRIPTION ENCOUNTER (OUTPATIENT)
Age: 74
End: 2024-01-27

## 2024-01-27 VITALS
DIASTOLIC BLOOD PRESSURE: 55 MMHG | HEART RATE: 70 BPM | OXYGEN SATURATION: 95 % | TEMPERATURE: 98 F | RESPIRATION RATE: 18 BRPM | SYSTOLIC BLOOD PRESSURE: 122 MMHG

## 2024-01-27 DIAGNOSIS — S22.20XA UNSPECIFIED FRACTURE OF STERNUM, INITIAL ENCOUNTER FOR CLOSED FRACTURE: ICD-10-CM

## 2024-01-27 LAB
ANION GAP SERPL CALC-SCNC: 3 MMOL/L — LOW (ref 5–17)
BUN SERPL-MCNC: 11 MG/DL — SIGNIFICANT CHANGE UP (ref 7–23)
CALCIUM SERPL-MCNC: 8.3 MG/DL — LOW (ref 8.5–10.1)
CHLORIDE SERPL-SCNC: 108 MMOL/L — SIGNIFICANT CHANGE UP (ref 96–108)
CO2 SERPL-SCNC: 29 MMOL/L — SIGNIFICANT CHANGE UP (ref 22–31)
CREAT SERPL-MCNC: 0.88 MG/DL — SIGNIFICANT CHANGE UP (ref 0.5–1.3)
EGFR: 92 ML/MIN/1.73M2 — SIGNIFICANT CHANGE UP
GLUCOSE SERPL-MCNC: 103 MG/DL — HIGH (ref 70–99)
HCT VFR BLD CALC: 42.9 % — SIGNIFICANT CHANGE UP (ref 39–50)
HCV AB S/CO SERPL IA: 0.13 S/CO — SIGNIFICANT CHANGE UP (ref 0–0.99)
HCV AB SERPL-IMP: SIGNIFICANT CHANGE UP
HGB BLD-MCNC: 14.8 G/DL — SIGNIFICANT CHANGE UP (ref 13–17)
MAGNESIUM SERPL-MCNC: 1.9 MG/DL — SIGNIFICANT CHANGE UP (ref 1.6–2.6)
MCHC RBC-ENTMCNC: 31.6 PG — SIGNIFICANT CHANGE UP (ref 27–34)
MCHC RBC-ENTMCNC: 34.5 GM/DL — SIGNIFICANT CHANGE UP (ref 32–36)
MCV RBC AUTO: 91.5 FL — SIGNIFICANT CHANGE UP (ref 80–100)
PHOSPHATE SERPL-MCNC: 2.8 MG/DL — SIGNIFICANT CHANGE UP (ref 2.5–4.5)
PLATELET # BLD AUTO: 292 K/UL — SIGNIFICANT CHANGE UP (ref 150–400)
POTASSIUM SERPL-MCNC: 3.7 MMOL/L — SIGNIFICANT CHANGE UP (ref 3.5–5.3)
POTASSIUM SERPL-SCNC: 3.7 MMOL/L — SIGNIFICANT CHANGE UP (ref 3.5–5.3)
RBC # BLD: 4.69 M/UL — SIGNIFICANT CHANGE UP (ref 4.2–5.8)
RBC # FLD: 13.2 % — SIGNIFICANT CHANGE UP (ref 10.3–14.5)
SODIUM SERPL-SCNC: 140 MMOL/L — SIGNIFICANT CHANGE UP (ref 135–145)
TROPONIN I, HIGH SENSITIVITY RESULT: 9.5 NG/L — SIGNIFICANT CHANGE UP
WBC # BLD: 11.66 K/UL — HIGH (ref 3.8–10.5)
WBC # FLD AUTO: 11.66 K/UL — HIGH (ref 3.8–10.5)

## 2024-01-27 PROCEDURE — 99233 SBSQ HOSP IP/OBS HIGH 50: CPT

## 2024-01-27 RX ORDER — LIDOCAINE 4 G/100G
1 CREAM TOPICAL DAILY
Refills: 0 | Status: DISCONTINUED | OUTPATIENT
Start: 2024-01-27 | End: 2024-01-27

## 2024-01-27 RX ORDER — LIDOCAINE 4 G/100G
1 CREAM TOPICAL
Qty: 2 | Refills: 0
Start: 2024-01-27 | End: 2024-02-05

## 2024-01-27 RX ORDER — CYCLOBENZAPRINE HYDROCHLORIDE 10 MG/1
1 TABLET, FILM COATED ORAL
Qty: 21 | Refills: 0
Start: 2024-01-27 | End: 2024-02-02

## 2024-01-27 RX ORDER — OXYCODONE HYDROCHLORIDE 5 MG/1
1 TABLET ORAL
Qty: 16 | Refills: 0
Start: 2024-01-27 | End: 2024-01-30

## 2024-01-27 RX ORDER — CYCLOBENZAPRINE HYDROCHLORIDE 10 MG/1
5 TABLET, FILM COATED ORAL EVERY 8 HOURS
Refills: 0 | Status: DISCONTINUED | OUTPATIENT
Start: 2024-01-27 | End: 2024-01-27

## 2024-01-27 RX ADMIN — OXYCODONE HYDROCHLORIDE 10 MILLIGRAM(S): 5 TABLET ORAL at 02:53

## 2024-01-27 RX ADMIN — OXYCODONE HYDROCHLORIDE 10 MILLIGRAM(S): 5 TABLET ORAL at 10:57

## 2024-01-27 RX ADMIN — OXYCODONE HYDROCHLORIDE 10 MILLIGRAM(S): 5 TABLET ORAL at 02:08

## 2024-01-27 RX ADMIN — TIOTROPIUM BROMIDE AND OLODATEROL 2 PUFF(S): 3.124; 2.736 SPRAY, METERED RESPIRATORY (INHALATION) at 10:08

## 2024-01-27 RX ADMIN — CYCLOBENZAPRINE HYDROCHLORIDE 5 MILLIGRAM(S): 10 TABLET, FILM COATED ORAL at 09:02

## 2024-01-27 RX ADMIN — Medication 1 TABLET(S): at 09:02

## 2024-01-27 RX ADMIN — PANTOPRAZOLE SODIUM 40 MILLIGRAM(S): 20 TABLET, DELAYED RELEASE ORAL at 09:01

## 2024-01-27 RX ADMIN — OXYCODONE HYDROCHLORIDE 10 MILLIGRAM(S): 5 TABLET ORAL at 06:44

## 2024-01-27 RX ADMIN — LIDOCAINE 1 PATCH: 4 CREAM TOPICAL at 09:01

## 2024-01-27 NOTE — DISCHARGE NOTE PROVIDER - NSDCMRMEDTOKEN_GEN_ALL_CORE_FT
Centrum Adults oral tablet: 1 tab(s) orally once a day  Colace sodium 100 mg oral capsule: 1 cap(s) orally 3 times a day, As Needed  cyclobenzaprine 5 mg oral tablet: 1 tab(s) orally every 8 hours as needed for  muscle spasm  Flomax 0.4 mg oral capsule: 1 cap(s) orally once a day (at bedtime)  lidocaine 5% topical film: Apply topically to affected area once a day apply for 12 hours, then remove for 12 hours  Percocet 10/325 325 mg-10 mg oral tablet: 1 tab(s) orally every 6 hours, As Needed MDD:4  Protonix 40 mg oral granule, enteric coated: 1 each orally 2 times a day  sucralfate 1 g/10 mL oral suspension: 10 milliliter(s) orally 4 times a day  sucralfate 1 g/10 mL oral suspension: 10 milliliter(s) orally 4 times a day  tiotropium-olodaterol 2.5 mcg-2.5 mcg/inh inhalation aerosol: 2 puff(s) inhaled once a day  Vitamin D3 2000 intl units oral tablet: 1 tab(s) orally once a day

## 2024-01-27 NOTE — DISCHARGE NOTE NURSING/CASE MANAGEMENT/SOCIAL WORK - PATIENT PORTAL LINK FT
You can access the FollowMyHealth Patient Portal offered by [facility] by registering at the following website: [email or web address]. By joining [employer]’s FollowMyHealth portal, you will also be able to view your health information using other applications (apps) compatible with our system.
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
## 2024-01-27 NOTE — DISCHARGE NOTE PROVIDER - CARE PROVIDER_API CALL
Brandi Salter  Surgical Critical Care  [address] [place] [id number]  Phone: [phone]  Fax: [phone]  Follow Up Time: 2 weeks
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
## 2024-01-27 NOTE — DISCHARGE NOTE NURSING/CASE MANAGEMENT/SOCIAL WORK - NSDCPEFALRISK_GEN_ALL_CORE
For information on Fall & Injury Prevention, visit: [email or web address] OR  [email or web address] OR  https://www.cdc.gov/steadi/patient.html
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
## 2024-01-28 ENCOUNTER — EMERGENCY (EMERGENCY)
Facility: HOSPITAL | Age: 74
LOS: 0 days | Discharge: HOME | End: 2024-01-28
Attending: HOSPITALIST
Payer: COMMERCIAL

## 2024-01-28 VITALS
TEMPERATURE: 99 F | DIASTOLIC BLOOD PRESSURE: 85 MMHG | OXYGEN SATURATION: 98 % | HEART RATE: 84 BPM | RESPIRATION RATE: 20 BRPM | SYSTOLIC BLOOD PRESSURE: 145 MMHG

## 2024-01-28 VITALS
RESPIRATION RATE: 14 BRPM | HEART RATE: 80 BPM | WEIGHT: 179.9 LBS | DIASTOLIC BLOOD PRESSURE: 76 MMHG | OXYGEN SATURATION: 92 % | HEIGHT: 70 IN | TEMPERATURE: 99 F | SYSTOLIC BLOOD PRESSURE: 147 MMHG
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
## 2024-01-28 DIAGNOSIS — Z98.89 OTHER SPECIFIED POSTPROCEDURAL STATES: Chronic | ICD-10-CM

## 2024-01-28 DIAGNOSIS — S22.20XA UNSPECIFIED FRACTURE OF STERNUM, INITIAL ENCOUNTER FOR CLOSED FRACTURE: ICD-10-CM
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
## 2024-01-28 LAB
ALBUMIN SERPL ELPH-MCNC: 3.3 G/DL — SIGNIFICANT CHANGE UP (ref 3.3–5)
ALP SERPL-CCNC: 87 U/L — SIGNIFICANT CHANGE UP (ref 40–120)
ALT FLD-CCNC: 37 U/L — SIGNIFICANT CHANGE UP (ref 12–78)
ANION GAP SERPL CALC-SCNC: 5 MMOL/L — SIGNIFICANT CHANGE UP (ref 5–17)
AST SERPL-CCNC: 23 U/L — SIGNIFICANT CHANGE UP (ref 15–37)
BASOPHILS # BLD AUTO: 0.03 K/UL — SIGNIFICANT CHANGE UP (ref 0–0.2)
BASOPHILS NFR BLD AUTO: 0.3 % — SIGNIFICANT CHANGE UP (ref 0–2)
BILIRUB SERPL-MCNC: 0.5 MG/DL — SIGNIFICANT CHANGE UP (ref 0.2–1.2)
BUN SERPL-MCNC: 13 MG/DL — SIGNIFICANT CHANGE UP (ref 7–23)
CALCIUM SERPL-MCNC: 8.9 MG/DL — SIGNIFICANT CHANGE UP (ref 8.5–10.1)
CHLORIDE SERPL-SCNC: 107 MMOL/L — SIGNIFICANT CHANGE UP (ref 96–108)
CO2 SERPL-SCNC: 28 MMOL/L — SIGNIFICANT CHANGE UP (ref 22–31)
CREAT SERPL-MCNC: 0.95 MG/DL — SIGNIFICANT CHANGE UP (ref 0.5–1.3)
EGFR: 86 ML/MIN/1.73M2 — SIGNIFICANT CHANGE UP
EOSINOPHIL # BLD AUTO: 0.08 K/UL — SIGNIFICANT CHANGE UP (ref 0–0.5)
EOSINOPHIL NFR BLD AUTO: 0.7 % — SIGNIFICANT CHANGE UP (ref 0–6)
GLUCOSE SERPL-MCNC: 113 MG/DL — HIGH (ref 70–99)
HCT VFR BLD CALC: 43.3 % — SIGNIFICANT CHANGE UP (ref 39–50)
HGB BLD-MCNC: 14.9 G/DL — SIGNIFICANT CHANGE UP (ref 13–17)
IMM GRANULOCYTES NFR BLD AUTO: 0.5 % — SIGNIFICANT CHANGE UP (ref 0–0.9)
LYMPHOCYTES # BLD AUTO: 1.34 K/UL — SIGNIFICANT CHANGE UP (ref 1–3.3)
LYMPHOCYTES # BLD AUTO: 12.2 % — LOW (ref 13–44)
MCHC RBC-ENTMCNC: 31.5 PG — SIGNIFICANT CHANGE UP (ref 27–34)
MCHC RBC-ENTMCNC: 34.4 GM/DL — SIGNIFICANT CHANGE UP (ref 32–36)
MCV RBC AUTO: 91.5 FL — SIGNIFICANT CHANGE UP (ref 80–100)
MONOCYTES # BLD AUTO: 1.11 K/UL — HIGH (ref 0–0.9)
MONOCYTES NFR BLD AUTO: 10.1 % — SIGNIFICANT CHANGE UP (ref 2–14)
NEUTROPHILS # BLD AUTO: 8.35 K/UL — HIGH (ref 1.8–7.4)
NEUTROPHILS NFR BLD AUTO: 76.2 % — SIGNIFICANT CHANGE UP (ref 43–77)
PLATELET # BLD AUTO: 267 K/UL — SIGNIFICANT CHANGE UP (ref 150–400)
POTASSIUM SERPL-MCNC: 3.7 MMOL/L — SIGNIFICANT CHANGE UP (ref 3.5–5.3)
POTASSIUM SERPL-SCNC: 3.7 MMOL/L — SIGNIFICANT CHANGE UP (ref 3.5–5.3)
PROT SERPL-MCNC: 7.2 GM/DL — SIGNIFICANT CHANGE UP (ref 6–8.3)
RBC # BLD: 4.73 M/UL — SIGNIFICANT CHANGE UP (ref 4.2–5.8)
RBC # FLD: 13.2 % — SIGNIFICANT CHANGE UP (ref 10.3–14.5)
SODIUM SERPL-SCNC: 140 MMOL/L — SIGNIFICANT CHANGE UP (ref 135–145)
TROPONIN I, HIGH SENSITIVITY RESULT: 7.56 NG/L — SIGNIFICANT CHANGE UP
WBC # BLD: 10.97 K/UL — HIGH (ref 3.8–10.5)
WBC # FLD AUTO: 10.97 K/UL — HIGH (ref 3.8–10.5)

## 2024-01-28 PROCEDURE — 99285 EMERGENCY DEPT VISIT HI MDM: CPT | Mod: 25

## 2024-01-28 PROCEDURE — 71260 CT THORAX DX C+: CPT | Mod: MD

## 2024-01-28 PROCEDURE — 99053 MED SERV 10PM-8AM 24 HR FAC: CPT

## 2024-01-28 PROCEDURE — 96374 THER/PROPH/DIAG INJ IV PUSH: CPT | Mod: XU

## 2024-01-28 PROCEDURE — 93010 ELECTROCARDIOGRAM REPORT: CPT

## 2024-01-28 PROCEDURE — 84484 ASSAY OF TROPONIN QUANT: CPT

## 2024-01-28 PROCEDURE — 80053 COMPREHEN METABOLIC PANEL: CPT

## 2024-01-28 PROCEDURE — 36415 COLL VENOUS BLD VENIPUNCTURE: CPT

## 2024-01-28 PROCEDURE — 71260 CT THORAX DX C+: CPT | Mod: 26,MD

## 2024-01-28 PROCEDURE — 99285 EMERGENCY DEPT VISIT HI MDM: CPT

## 2024-01-28 PROCEDURE — 93005 ELECTROCARDIOGRAM TRACING: CPT

## 2024-01-28 PROCEDURE — 85025 COMPLETE CBC W/AUTO DIFF WBC: CPT

## 2024-01-28 PROCEDURE — 96375 TX/PRO/DX INJ NEW DRUG ADDON: CPT | Mod: XU

## 2024-01-28 RX ORDER — MORPHINE SULFATE 50 MG/1
4 CAPSULE, EXTENDED RELEASE ORAL ONCE
Refills: 0 | Status: DISCONTINUED | OUTPATIENT
Start: 2024-01-28 | End: 2024-01-28

## 2024-01-28 RX ORDER — OXYCODONE AND ACETAMINOPHEN 5; 325 MG/1; MG/1
2 TABLET ORAL
Qty: 20 | Refills: 0
Start: 2024-01-28 | End: 2024-01-30

## 2024-01-28 RX ORDER — ONDANSETRON 8 MG/1
4 TABLET, FILM COATED ORAL ONCE
Refills: 0 | Status: COMPLETED | OUTPATIENT
Start: 2024-01-28 | End: 2024-01-28

## 2024-01-28 RX ADMIN — MORPHINE SULFATE 4 MILLIGRAM(S): 50 CAPSULE, EXTENDED RELEASE ORAL at 02:28

## 2024-01-28 RX ADMIN — ONDANSETRON 4 MILLIGRAM(S): 8 TABLET, FILM COATED ORAL at 02:28

## 2024-01-28 NOTE — ED PROVIDER NOTE - PHYSICAL EXAMINATION
Constitutional: NAD AAOx3  Eyes: PERRLA EOMI  Head: Normocephalic atraumatic  Mouth: MMM  Cardiac/Chest: regular rate & rhythm, + ttp midsternum  Resp: Lungs CTAB  GI: Abd s/nt/nd  Neuro: CN2-12 intact  Skin: No visible rashes
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
## 2024-01-28 NOTE — ED ADULT NURSE NOTE - NSFALLRISKINTERV_ED_ALL_ED
Assistance OOB with selected safe patient handling equipment if applicable/Assistance with ambulation/Communicate fall risk and risk factors to all staff, patient, and family/Monitor gait and stability/Provide visual cue: yellow wristband, yellow gown, etc/Reinforce activity limits and safety measures with patient and family/Call bell, personal items and telephone in reach/Instruct patient to call for assistance before getting out of bed/chair/stretcher/Non-slip footwear applied when patient is off stretcher/[place] to call system/Physically safe environment - no spills, clutter or unnecessary equipment/Purposeful Proactive Rounding/Room/bathroom lighting operational, light cord in reach
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
## 2024-01-28 NOTE — ED PROVIDER NOTE - PATIENT PORTAL LINK FT
You can access the FollowMyHealth Patient Portal offered by [facility] by registering at the following website: [email or web address]. By joining [employer]’s FollowMyHealth portal, you will also be able to view your health information using other applications (apps) compatible with our system.

## 2024-01-29 PROBLEM — S22.20XA UNSPECIFIED FRACTURE OF STERNUM, INITIAL ENCOUNTER FOR CLOSED FRACTURE: Chronic | Status: ACTIVE | Noted: 2024-01-28
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
## 2024-02-01 DIAGNOSIS — Z82.3 FAMILY HISTORY OF STROKE: ICD-10-CM

## 2024-02-01 DIAGNOSIS — Z92.241 PERSONAL HISTORY OF SYSTEMIC STEROID THERAPY: ICD-10-CM
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
## 2024-02-01 DIAGNOSIS — S20.219A CONTUSION OF UNSPECIFIED FRONT WALL OF THORAX, INITIAL ENCOUNTER: ICD-10-CM

## 2024-02-01 DIAGNOSIS — K40.20 BILATERAL INGUINAL HERNIA, WITHOUT OBSTRUCTION OR GANGRENE, NOT SPECIFIED AS RECURRENT: ICD-10-CM
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
## 2024-02-01 DIAGNOSIS — S22.20XA UNSPECIFIED FRACTURE OF STERNUM, INITIAL ENCOUNTER FOR CLOSED FRACTURE: ICD-10-CM

## 2024-02-01 DIAGNOSIS — J44.89 OTHER SPECIFIED CHRONIC OBSTRUCTIVE PULMONARY DISEASE: ICD-10-CM

## 2024-02-01 DIAGNOSIS — Z87.891 PERSONAL HISTORY OF NICOTINE DEPENDENCE: ICD-10-CM

## 2024-02-01 DIAGNOSIS — Z80.9 FAMILY HISTORY OF MALIGNANT NEOPLASM, UNSPECIFIED: ICD-10-CM

## 2024-02-01 DIAGNOSIS — M54.32 SCIATICA, LEFT SIDE: ICD-10-CM

## 2024-02-01 DIAGNOSIS — I51.7 CARDIOMEGALY: ICD-10-CM

## 2024-02-08 ENCOUNTER — APPOINTMENT (OUTPATIENT)
Dept: SURGERY | Facility: CLINIC | Age: 74
End: 2024-02-08

## 2024-02-08 ENCOUNTER — NON-APPOINTMENT (OUTPATIENT)
Age: 74
End: 2024-02-08

## 2024-02-08 ENCOUNTER — APPOINTMENT (OUTPATIENT)
Dept: PULMONOLOGY | Facility: CLINIC | Age: 74
End: 2024-02-08
Payer: MEDICARE

## 2024-02-08 VITALS
WEIGHT: 160 LBS | DIASTOLIC BLOOD PRESSURE: 76 MMHG | OXYGEN SATURATION: 98 % | HEIGHT: 66 IN | SYSTOLIC BLOOD PRESSURE: 140 MMHG | BODY MASS INDEX: 25.71 KG/M2 | HEART RATE: 75 BPM

## 2024-02-08 VITALS — HEIGHT: 66 IN | WEIGHT: 160 LBS | BODY MASS INDEX: 25.71 KG/M2

## 2024-02-08 DIAGNOSIS — S22.20XA UNSPECIFIED FRACTURE OF STERNUM, INITIAL ENCOUNTER FOR CLOSED FRACTURE: ICD-10-CM

## 2024-02-08 PROCEDURE — 99214 OFFICE O/P EST MOD 30 MIN: CPT

## 2024-02-08 NOTE — ADDENDUM
[FreeTextEntry1] : Documented by Michael Logan acting as a scribe for Dr. Grzegorz Corado on 02/08/2024. All medical record entries made by the Scribe were at my, Dr. Grzegorz Corado's, direction and personally dictated by me on 02/08/2024. I have reviewed the chart and agree that the record accurately reflects my personal performance of the history, physical exam, assessment and plan. I have also personally directed, reviewed, and agree with the discharge instructions.

## 2024-02-08 NOTE — PHYSICAL EXAM
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
## 2024-02-09 RX ORDER — LEVALBUTEROL HYDROCHLORIDE 0.63 MG/3ML
0.63 SOLUTION RESPIRATORY (INHALATION)
Qty: 120 | Refills: 2 | Status: ACTIVE | COMMUNITY
Start: 2024-02-08 | End: 1900-01-01

## 2024-02-09 RX ORDER — BUDESONIDE 0.5 MG/2ML
0.5 INHALANT ORAL TWICE DAILY
Qty: 60 | Refills: 3 | Status: ACTIVE | COMMUNITY
Start: 2024-02-08 | End: 1900-01-01

## 2024-05-06 NOTE — ASSESSMENT
[FreeTextEntry1] : 69 year old male former heavy smoker presents for evaluation COPD\par \par Initiate Anoro Ellipta daily as directed \par CT lung cancer screening\par Consider Cardiology evaluation\par \par CT lung cancer screening shared decision making done, patient currently not smoking, quit 11 years ago, meets criteria and agrees to CT lung cancer screening.\par \par Follow up after CT chest
[FreeTextEntry1] : 69 year old male former heavy smoker presents for evaluation COPD\par \par Initiate Anoro Ellipta daily as directed \par CT lung cancer screening\par Consider Cardiology evaluation\par \par CT lung cancer screening shared decision making done, patient currently not smoking, quit 11 years ago, meets criteria and agrees to CT lung cancer screening.\par \par Follow up after CT chest
98.4

## 2024-06-18 ENCOUNTER — APPOINTMENT (OUTPATIENT)
Dept: PULMONOLOGY | Facility: CLINIC | Age: 74
End: 2024-06-18
Payer: MEDICARE

## 2024-06-18 VITALS
SYSTOLIC BLOOD PRESSURE: 144 MMHG | HEIGHT: 66 IN | HEART RATE: 76 BPM | RESPIRATION RATE: 16 BRPM | BODY MASS INDEX: 25.71 KG/M2 | OXYGEN SATURATION: 97 % | TEMPERATURE: 97.1 F | WEIGHT: 160 LBS | DIASTOLIC BLOOD PRESSURE: 78 MMHG

## 2024-06-18 DIAGNOSIS — R93.89 ABNORMAL FINDINGS ON DIAGNOSTIC IMAGING OF OTHER SPECIFIED BODY STRUCTURES: ICD-10-CM

## 2024-06-18 DIAGNOSIS — G25.81 RESTLESS LEGS SYNDROME: ICD-10-CM

## 2024-06-18 DIAGNOSIS — J30.9 ALLERGIC RHINITIS, UNSPECIFIED: ICD-10-CM

## 2024-06-18 DIAGNOSIS — K21.9 GASTRO-ESOPHAGEAL REFLUX DISEASE W/OUT ESOPHAGITIS: ICD-10-CM

## 2024-06-18 DIAGNOSIS — R06.02 SHORTNESS OF BREATH: ICD-10-CM

## 2024-06-18 DIAGNOSIS — J44.89 OTHER SPECIFIED CHRONIC OBSTRUCTIVE PULMONARY DISEASE: ICD-10-CM

## 2024-06-18 DIAGNOSIS — J44.9 CHRONIC OBSTRUCTIVE PULMONARY DISEASE, UNSPECIFIED: ICD-10-CM

## 2024-06-18 PROCEDURE — 95012 NITRIC OXIDE EXP GAS DETER: CPT

## 2024-06-18 PROCEDURE — ZZZZZ: CPT

## 2024-06-18 PROCEDURE — 94010 BREATHING CAPACITY TEST: CPT

## 2024-06-18 PROCEDURE — 94727 GAS DIL/WSHOT DETER LNG VOL: CPT

## 2024-06-18 PROCEDURE — 99214 OFFICE O/P EST MOD 30 MIN: CPT | Mod: 25

## 2024-06-18 PROCEDURE — 94729 DIFFUSING CAPACITY: CPT

## 2024-06-18 RX ORDER — ALBUTEROL SULFATE AND BUDESONIDE 90; 80 UG/1; UG/1
90-80 AEROSOL, METERED RESPIRATORY (INHALATION)
Qty: 2 | Refills: 3 | Status: ACTIVE | COMMUNITY
Start: 2024-06-18 | End: 1900-01-01
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
## 2024-06-18 NOTE — ADDENDUM
[FreeTextEntry1] :  Documented by Chava Peterson acting as a scribe for Dr. Grzegorz Corado on 06/18/2024 .   All medical record entries made by the Scribe were at my, Dr. Grzegorz Corado's direction and personally dictated by me on 06/18/2024 . I have reviewed the chart and agree that the record accurately reflects my personal performance of the history, Physical exam, assessment, and plan. I have also personally directed, reviewed, and agree with the discharge instructions.

## 2024-06-18 NOTE — PHYSICAL EXAM
[No Acute Distress] : no acute distress [Normal Oropharynx] : normal oropharynx [Normal Appearance] : normal appearance [No Neck Mass] : no neck mass [Normal Rate/Rhythm] : normal rate/rhythm [Normal S1, S2] : normal s1, s2 [No Murmurs] : no murmurs [No Resp Distress] : no resp distress [Clear to Auscultation Bilaterally] : clear to auscultation bilaterally [No Abnormalities] : no abnormalities [Benign] : benign [Normal Gait] : normal gait [No Clubbing] : no clubbing [No Cyanosis] : no cyanosis [No Edema] : no edema [FROM] : FROM [Normal Color/ Pigmentation] : normal color/ pigmentation [No Focal Deficits] : no focal deficits [Oriented x3] : oriented x3 [Normal Affect] : normal affect [I] : Mallampati Class: I [TextBox_11] : dentures [TextBox_68] : I:E ratio 1:3; clear

## 2024-06-18 NOTE — PROCEDURE
[FreeTextEntry1] :   Full PFT reveals moderate restrictive dysfunction at mid-low lung volumes; FEV1 was 8.16 L which is 84 % of predicted, normal lung volumes, normal diffusions, at 16.8 L which is 94 % predicted, normal flow volume loop. PFT's for performed to evaluate for SOB.    FENO was 23; a normal value being less than 25. Fractional exhaled nitric oxide (FENO) is regarded as a simple, noninvasive method for assessing eosinophilic airway inflammation. Produced by a variety of cells within the lung, nitric oxide (NO) concentrations are generally low in healthy individuals. However, high concentrations of NO appear to be involved in nonspecific host defense mechanisms and chronic inflammatory diseases such as asthma. The American Thoracic Society (ATS) therefore has strongly recommended using FENO to aid in the assessment, management, and long-term monitoring of eosinophilic airway inflammation and asthma, and for identifying steroid responsive individuals whose chronic respiratory symptoms may be caused by airway inflammation. In their 2011 clinical practice guideline, the ATS emphasizes the importance of using FENO.
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
## 2024-11-11 ENCOUNTER — APPOINTMENT (OUTPATIENT)
Dept: PULMONOLOGY | Facility: CLINIC | Age: 74
End: 2024-11-11
Payer: MEDICARE

## 2024-11-11 VITALS
HEART RATE: 71 BPM | OXYGEN SATURATION: 95 % | RESPIRATION RATE: 16 BRPM | SYSTOLIC BLOOD PRESSURE: 120 MMHG | WEIGHT: 160 LBS | DIASTOLIC BLOOD PRESSURE: 70 MMHG | TEMPERATURE: 97.6 F | HEIGHT: 66 IN | BODY MASS INDEX: 25.71 KG/M2

## 2024-11-11 DIAGNOSIS — R06.02 SHORTNESS OF BREATH: ICD-10-CM

## 2024-11-11 DIAGNOSIS — K21.9 GASTRO-ESOPHAGEAL REFLUX DISEASE W/OUT ESOPHAGITIS: ICD-10-CM

## 2024-11-11 DIAGNOSIS — R93.89 ABNORMAL FINDINGS ON DIAGNOSTIC IMAGING OF OTHER SPECIFIED BODY STRUCTURES: ICD-10-CM

## 2024-11-11 DIAGNOSIS — J30.9 ALLERGIC RHINITIS, UNSPECIFIED: ICD-10-CM

## 2024-11-11 DIAGNOSIS — J44.9 CHRONIC OBSTRUCTIVE PULMONARY DISEASE, UNSPECIFIED: ICD-10-CM

## 2024-11-11 DIAGNOSIS — J44.89 OTHER SPECIFIED CHRONIC OBSTRUCTIVE PULMONARY DISEASE: ICD-10-CM

## 2024-11-11 DIAGNOSIS — G25.81 RESTLESS LEGS SYNDROME: ICD-10-CM

## 2024-11-11 PROCEDURE — 94010 BREATHING CAPACITY TEST: CPT

## 2024-11-11 PROCEDURE — 95012 NITRIC OXIDE EXP GAS DETER: CPT

## 2024-11-11 PROCEDURE — 99214 OFFICE O/P EST MOD 30 MIN: CPT | Mod: 25

## 2024-11-11 RX ORDER — ALBUTEROL SULFATE AND BUDESONIDE 90; 80 UG/1; UG/1
90-80 AEROSOL, METERED RESPIRATORY (INHALATION)
Qty: 1 | Refills: 3 | Status: ACTIVE | COMMUNITY
Start: 2024-11-11 | End: 1900-01-01

## 2024-11-17 LAB
BASOPHILS # BLD AUTO: 0.06 K/UL
BASOPHILS NFR BLD AUTO: 0.7 %
EOSINOPHIL # BLD AUTO: 0.15 K/UL
EOSINOPHIL NFR BLD AUTO: 1.6 %
HCT VFR BLD CALC: 51.5 %
HGB BLD-MCNC: 16.8 G/DL
IMM GRANULOCYTES NFR BLD AUTO: 0.8 %
LYMPHOCYTES # BLD AUTO: 2.24 K/UL
LYMPHOCYTES NFR BLD AUTO: 24.6 %
MAN DIFF?: NORMAL
MCHC RBC-ENTMCNC: 30.7 PG
MCHC RBC-ENTMCNC: 32.6 G/DL
MCV RBC AUTO: 94.1 FL
MONOCYTES # BLD AUTO: 0.84 K/UL
MONOCYTES NFR BLD AUTO: 9.2 %
NEUTROPHILS # BLD AUTO: 5.74 K/UL
NEUTROPHILS NFR BLD AUTO: 63.1 %
PLATELET # BLD AUTO: 304 K/UL
RBC # BLD: 5.47 M/UL
RBC # FLD: 13.2 %
WBC # FLD AUTO: 9.1 K/UL

## 2024-11-19 ENCOUNTER — TRANSCRIPTION ENCOUNTER (OUTPATIENT)
Age: 74
End: 2024-11-19

## 2024-12-12 ENCOUNTER — NON-APPOINTMENT (OUTPATIENT)
Age: 74
End: 2024-12-12

## 2025-01-02 ENCOUNTER — APPOINTMENT (OUTPATIENT)
Dept: PULMONOLOGY | Facility: CLINIC | Age: 75
End: 2025-01-02
Payer: MEDICARE

## 2025-01-02 VITALS
OXYGEN SATURATION: 98 % | HEIGHT: 66 IN | DIASTOLIC BLOOD PRESSURE: 80 MMHG | WEIGHT: 166 LBS | RESPIRATION RATE: 16 BRPM | TEMPERATURE: 97.9 F | HEART RATE: 75 BPM | BODY MASS INDEX: 26.68 KG/M2 | SYSTOLIC BLOOD PRESSURE: 120 MMHG

## 2025-01-02 DIAGNOSIS — J30.9 ALLERGIC RHINITIS, UNSPECIFIED: ICD-10-CM

## 2025-01-02 DIAGNOSIS — K21.9 GASTRO-ESOPHAGEAL REFLUX DISEASE W/OUT ESOPHAGITIS: ICD-10-CM

## 2025-01-02 DIAGNOSIS — R06.02 SHORTNESS OF BREATH: ICD-10-CM

## 2025-01-02 DIAGNOSIS — J44.9 CHRONIC OBSTRUCTIVE PULMONARY DISEASE, UNSPECIFIED: ICD-10-CM

## 2025-01-02 DIAGNOSIS — J44.89 OTHER SPECIFIED CHRONIC OBSTRUCTIVE PULMONARY DISEASE: ICD-10-CM

## 2025-01-02 PROCEDURE — ZZZZZ: CPT

## 2025-01-02 PROCEDURE — 94727 GAS DIL/WSHOT DETER LNG VOL: CPT

## 2025-01-02 PROCEDURE — 94729 DIFFUSING CAPACITY: CPT

## 2025-01-02 PROCEDURE — 94010 BREATHING CAPACITY TEST: CPT

## 2025-01-02 PROCEDURE — 99214 OFFICE O/P EST MOD 30 MIN: CPT | Mod: 25

## 2025-02-21 RX ORDER — UMECLIDINIUM BROMIDE AND VILANTEROL TRIFENATATE 62.5; 25 UG/1; UG/1
62.5-25 POWDER RESPIRATORY (INHALATION)
Qty: 3 | Refills: 1 | Status: ACTIVE | COMMUNITY
Start: 2025-02-21 | End: 1900-01-01

## 2025-05-17 ENCOUNTER — NON-APPOINTMENT (OUTPATIENT)
Age: 75
End: 2025-05-17

## 2025-05-19 ENCOUNTER — APPOINTMENT (OUTPATIENT)
Dept: PULMONOLOGY | Facility: CLINIC | Age: 75
End: 2025-05-19
Payer: MEDICARE

## 2025-05-19 VITALS
HEIGHT: 66 IN | BODY MASS INDEX: 26.68 KG/M2 | OXYGEN SATURATION: 97 % | WEIGHT: 166 LBS | DIASTOLIC BLOOD PRESSURE: 70 MMHG | RESPIRATION RATE: 16 BRPM | TEMPERATURE: 97.8 F | HEART RATE: 68 BPM | SYSTOLIC BLOOD PRESSURE: 120 MMHG

## 2025-05-19 DIAGNOSIS — J30.9 ALLERGIC RHINITIS, UNSPECIFIED: ICD-10-CM

## 2025-05-19 DIAGNOSIS — J44.89 OTHER SPECIFIED CHRONIC OBSTRUCTIVE PULMONARY DISEASE: ICD-10-CM

## 2025-05-19 DIAGNOSIS — K21.9 GASTRO-ESOPHAGEAL REFLUX DISEASE W/OUT ESOPHAGITIS: ICD-10-CM

## 2025-05-19 DIAGNOSIS — R06.02 SHORTNESS OF BREATH: ICD-10-CM

## 2025-05-19 DIAGNOSIS — G25.81 RESTLESS LEGS SYNDROME: ICD-10-CM

## 2025-05-19 DIAGNOSIS — J44.9 CHRONIC OBSTRUCTIVE PULMONARY DISEASE, UNSPECIFIED: ICD-10-CM

## 2025-05-19 DIAGNOSIS — R93.89 ABNORMAL FINDINGS ON DIAGNOSTIC IMAGING OF OTHER SPECIFIED BODY STRUCTURES: ICD-10-CM

## 2025-05-19 PROCEDURE — ZZZZZ: CPT

## 2025-05-19 PROCEDURE — 94010 BREATHING CAPACITY TEST: CPT

## 2025-05-19 PROCEDURE — 99214 OFFICE O/P EST MOD 30 MIN: CPT | Mod: 25

## 2025-05-19 PROCEDURE — 94727 GAS DIL/WSHOT DETER LNG VOL: CPT

## 2025-05-19 PROCEDURE — 94729 DIFFUSING CAPACITY: CPT

## 2025-05-19 PROCEDURE — 95012 NITRIC OXIDE EXP GAS DETER: CPT
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
